# Patient Record
Sex: FEMALE | Race: WHITE | Employment: OTHER | URBAN - METROPOLITAN AREA
[De-identification: names, ages, dates, MRNs, and addresses within clinical notes are randomized per-mention and may not be internally consistent; named-entity substitution may affect disease eponyms.]

---

## 2020-01-01 ENCOUNTER — HOME CARE VISIT (OUTPATIENT)
Dept: HOSPICE | Facility: HOSPICE | Age: 65
End: 2020-01-01
Payer: COMMERCIAL

## 2020-01-01 ENCOUNTER — APPOINTMENT (OUTPATIENT)
Dept: MRI IMAGING | Age: 65
DRG: 041 | End: 2020-01-01
Attending: HOSPITALIST
Payer: COMMERCIAL

## 2020-01-01 ENCOUNTER — HOSPITAL ENCOUNTER (OUTPATIENT)
Dept: MRI IMAGING | Age: 65
DRG: 041 | End: 2020-01-01
Attending: HOSPITALIST
Payer: COMMERCIAL

## 2020-01-01 ENCOUNTER — APPOINTMENT (OUTPATIENT)
Dept: CT IMAGING | Age: 65
DRG: 041 | End: 2020-01-01
Attending: HOSPITALIST
Payer: COMMERCIAL

## 2020-01-01 ENCOUNTER — HOSPITAL ENCOUNTER (INPATIENT)
Age: 65
LOS: 7 days | Discharge: HOSPICE | DRG: 041 | End: 2020-09-07
Attending: EMERGENCY MEDICINE | Admitting: HOSPITALIST
Payer: COMMERCIAL

## 2020-01-01 ENCOUNTER — HOME CARE VISIT (OUTPATIENT)
Dept: SCHEDULING | Facility: HOME HEALTH | Age: 65
End: 2020-01-01
Payer: COMMERCIAL

## 2020-01-01 ENCOUNTER — ANESTHESIA (OUTPATIENT)
Dept: SURGERY | Age: 65
DRG: 041 | End: 2020-01-01
Payer: COMMERCIAL

## 2020-01-01 ENCOUNTER — APPOINTMENT (OUTPATIENT)
Dept: MRI IMAGING | Age: 65
DRG: 041 | End: 2020-01-01
Attending: PSYCHIATRY & NEUROLOGY
Payer: COMMERCIAL

## 2020-01-01 ENCOUNTER — PATIENT OUTREACH (OUTPATIENT)
Dept: CASE MANAGEMENT | Age: 65
End: 2020-01-01

## 2020-01-01 ENCOUNTER — APPOINTMENT (OUTPATIENT)
Dept: GENERAL RADIOLOGY | Age: 65
DRG: 041 | End: 2020-01-01
Attending: SURGERY
Payer: COMMERCIAL

## 2020-01-01 ENCOUNTER — HOSPITAL ENCOUNTER (OUTPATIENT)
Dept: CT IMAGING | Age: 65
Discharge: HOME | End: 2020-08-11
Attending: NURSE PRACTITIONER
Payer: COMMERCIAL

## 2020-01-01 ENCOUNTER — APPOINTMENT (OUTPATIENT)
Dept: GENERAL RADIOLOGY | Age: 65
DRG: 041 | End: 2020-01-01
Attending: HOSPITALIST
Payer: COMMERCIAL

## 2020-01-01 ENCOUNTER — ANESTHESIA EVENT (OUTPATIENT)
Dept: SURGERY | Age: 65
DRG: 041 | End: 2020-01-01
Payer: COMMERCIAL

## 2020-01-01 ENCOUNTER — HOSPICE ADMISSION (OUTPATIENT)
Dept: HOSPICE | Facility: HOSPICE | Age: 65
End: 2020-01-01
Payer: COMMERCIAL

## 2020-01-01 ENCOUNTER — APPOINTMENT (OUTPATIENT)
Dept: VASCULAR SURGERY | Age: 65
DRG: 041 | End: 2020-01-01
Attending: PSYCHIATRY & NEUROLOGY
Payer: COMMERCIAL

## 2020-01-01 VITALS
HEART RATE: 84 BPM | HEIGHT: 69 IN | SYSTOLIC BLOOD PRESSURE: 149 MMHG | DIASTOLIC BLOOD PRESSURE: 66 MMHG | OXYGEN SATURATION: 96 % | TEMPERATURE: 98.1 F | RESPIRATION RATE: 18 BRPM | BODY MASS INDEX: 34.07 KG/M2 | WEIGHT: 230 LBS

## 2020-01-01 VITALS
WEIGHT: 230 LBS | OXYGEN SATURATION: 98 % | HEART RATE: 100 BPM | SYSTOLIC BLOOD PRESSURE: 130 MMHG | HEIGHT: 69 IN | DIASTOLIC BLOOD PRESSURE: 84 MMHG | RESPIRATION RATE: 16 BRPM | BODY MASS INDEX: 34.07 KG/M2

## 2020-01-01 VITALS
SYSTOLIC BLOOD PRESSURE: 140 MMHG | HEART RATE: 120 BPM | DIASTOLIC BLOOD PRESSURE: 80 MMHG | RESPIRATION RATE: 24 BRPM | TEMPERATURE: 98.1 F

## 2020-01-01 VITALS
OXYGEN SATURATION: 97 % | SYSTOLIC BLOOD PRESSURE: 120 MMHG | RESPIRATION RATE: 22 BRPM | HEART RATE: 122 BPM | DIASTOLIC BLOOD PRESSURE: 58 MMHG | TEMPERATURE: 97.8 F

## 2020-01-01 DIAGNOSIS — R42 DIZZINESS: ICD-10-CM

## 2020-01-01 DIAGNOSIS — R29.6 REPEATED FALLS: ICD-10-CM

## 2020-01-01 DIAGNOSIS — R26.1 PARAPLEGIC GAIT: ICD-10-CM

## 2020-01-01 DIAGNOSIS — I67.89 CEREBRAL MICROVASCULAR DISEASE: ICD-10-CM

## 2020-01-01 DIAGNOSIS — R55 CONVULSIVE SYNCOPE: ICD-10-CM

## 2020-01-01 DIAGNOSIS — C34.91 PRIMARY MALIGNANT NEOPLASM OF RIGHT LUNG METASTATIC TO OTHER SITE (HCC): ICD-10-CM
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
## 2020-01-01 DIAGNOSIS — R59.1 LYMPHADENOPATHY: ICD-10-CM

## 2020-01-01 DIAGNOSIS — C70.0: ICD-10-CM

## 2020-01-01 DIAGNOSIS — R63.4 WEIGHT LOSS: ICD-10-CM

## 2020-01-01 DIAGNOSIS — R41.0 CONFUSION: ICD-10-CM

## 2020-01-01 DIAGNOSIS — I65.23 BILATERAL CAROTID ARTERY STENOSIS: ICD-10-CM

## 2020-01-01 DIAGNOSIS — C34.90 LUNG CANCER METASTATIC TO BRAIN (HCC): ICD-10-CM

## 2020-01-01 DIAGNOSIS — R41.82 ACUTE ALTERATION IN MENTAL STATUS: ICD-10-CM

## 2020-01-01 DIAGNOSIS — R29.898 BILATERAL LEG WEAKNESS: Primary | ICD-10-CM

## 2020-01-01 DIAGNOSIS — C79.31 LUNG CANCER METASTATIC TO BRAIN (HCC): ICD-10-CM

## 2020-01-01 DIAGNOSIS — H53.9 VISUAL DISTURBANCES: ICD-10-CM
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
## 2020-01-01 LAB
ACHR AB SER-SCNC: <0.03 NMOL/L (ref 0–0.24)
ACHR BLOCK AB SER-ACNC: 24 % (ref 0–25)
ACHR MOD AB/ACHR TOTAL SFR SER: <12 % (ref 0–20)
ALBUMIN SERPL-MCNC: 2.8 G/DL (ref 3.5–5)
ALBUMIN SERPL-MCNC: 2.9 G/DL (ref 3.5–5)
ALBUMIN SERPL-MCNC: 2.9 G/DL (ref 3.5–5)
ALBUMIN SERPL-MCNC: 3 G/DL (ref 3.5–5)
ALBUMIN/GLOB SERPL: 0.8 {RATIO} (ref 1.1–2.2)
ALP SERPL-CCNC: 100 U/L (ref 45–117)
ALP SERPL-CCNC: 106 U/L (ref 45–117)
ALP SERPL-CCNC: 106 U/L (ref 45–117)
ALP SERPL-CCNC: 110 U/L (ref 45–117)
ALP SERPL-CCNC: 97 U/L (ref 45–117)
ALP SERPL-CCNC: 99 U/L (ref 45–117)
ALT SERPL-CCNC: 103 U/L (ref 12–78)
ALT SERPL-CCNC: 44 U/L (ref 12–78)
ALT SERPL-CCNC: 54 U/L (ref 12–78)
ALT SERPL-CCNC: 58 U/L (ref 12–78)
ALT SERPL-CCNC: 60 U/L (ref 12–78)
ALT SERPL-CCNC: 65 U/L (ref 12–78)
AMPHET UR QL SCN: NEGATIVE
ANA SER QL: POSITIVE
ANION GAP SERPL CALC-SCNC: 0 MMOL/L (ref 5–15)
ANION GAP SERPL CALC-SCNC: 1 MMOL/L (ref 5–15)
ANION GAP SERPL CALC-SCNC: 2 MMOL/L (ref 5–15)
ANION GAP SERPL CALC-SCNC: 3 MMOL/L (ref 5–15)
ANION GAP SERPL CALC-SCNC: 5 MMOL/L (ref 5–15)
ANION GAP SERPL CALC-SCNC: 5 MMOL/L (ref 5–15)
APPEARANCE UR: ABNORMAL
AST SERPL-CCNC: 26 U/L (ref 15–37)
AST SERPL-CCNC: 30 U/L (ref 15–37)
AST SERPL-CCNC: 38 U/L (ref 15–37)
AST SERPL-CCNC: 44 U/L (ref 15–37)
AST SERPL-CCNC: 46 U/L (ref 15–37)
AST SERPL-CCNC: 52 U/L (ref 15–37)
ATRIAL RATE: 96 BPM
BACTERIA SPEC CULT: ABNORMAL
BACTERIA URNS QL MICRO: ABNORMAL /HPF
BARBITURATES UR QL SCN: NEGATIVE
BASOPHILS # BLD: 0 K/UL (ref 0–0.1)
BASOPHILS NFR BLD: 0 % (ref 0–1)
BENZODIAZ UR QL: NEGATIVE
BILIRUB SERPL-MCNC: 0.5 MG/DL (ref 0.2–1)
BILIRUB SERPL-MCNC: 0.9 MG/DL (ref 0.2–1)
BILIRUB SERPL-MCNC: 1 MG/DL (ref 0.2–1)
BILIRUB SERPL-MCNC: 1 MG/DL (ref 0.2–1)
BILIRUB UR QL CFM: NEGATIVE
BUN SERPL-MCNC: 19 MG/DL (ref 6–20)
BUN SERPL-MCNC: 19 MG/DL (ref 6–20)
BUN SERPL-MCNC: 20 MG/DL (ref 6–20)
BUN SERPL-MCNC: 21 MG/DL (ref 6–20)
BUN SERPL-MCNC: 21 MG/DL (ref 6–20)
BUN SERPL-MCNC: 26 MG/DL (ref 6–20)
BUN/CREAT SERPL: 36 (ref 12–20)
BUN/CREAT SERPL: 37 (ref 12–20)
BUN/CREAT SERPL: 42 (ref 12–20)
BUN/CREAT SERPL: 46 (ref 12–20)
CALCIUM SERPL-MCNC: 8.3 MG/DL (ref 8.5–10.1)
CALCIUM SERPL-MCNC: 8.9 MG/DL (ref 8.5–10.1)
CALCIUM SERPL-MCNC: 8.9 MG/DL (ref 8.5–10.1)
CALCIUM SERPL-MCNC: 9.1 MG/DL (ref 8.5–10.1)
CALCULATED P AXIS, ECG09: 25 DEGREES
CALCULATED R AXIS, ECG10: 49 DEGREES
CALCULATED T AXIS, ECG11: 47 DEGREES
CANCER AG125 SERPL-ACNC: 353 U/ML (ref 1.5–35)
CANNABINOIDS UR QL SCN: NEGATIVE
CC UR VC: ABNORMAL
CHLORIDE SERPL-SCNC: 100 MMOL/L (ref 97–108)
CHLORIDE SERPL-SCNC: 98 MMOL/L (ref 97–108)
CHLORIDE SERPL-SCNC: 98 MMOL/L (ref 97–108)
CHLORIDE SERPL-SCNC: 99 MMOL/L (ref 97–108)
CHOLEST SERPL-MCNC: 130 MG/DL
CHOLEST SERPL-MCNC: 134 MG/DL
CK SERPL-CCNC: 105 U/L (ref 26–192)
CK SERPL-CCNC: 85 U/L (ref 26–192)
CO2 SERPL-SCNC: 29 MMOL/L (ref 21–32)
CO2 SERPL-SCNC: 30 MMOL/L (ref 21–32)
CO2 SERPL-SCNC: 31 MMOL/L (ref 21–32)
CO2 SERPL-SCNC: 32 MMOL/L (ref 21–32)
CO2 SERPL-SCNC: 33 MMOL/L (ref 21–32)
CO2 SERPL-SCNC: 33 MMOL/L (ref 21–32)
COCAINE UR QL SCN: NEGATIVE
COLOR UR: ABNORMAL
COMMENT, HOLDF: NORMAL
CREAT SERPL-MCNC: 0.5 MG/DL (ref 0.55–1.02)
CREAT SERPL-MCNC: 0.51 MG/DL (ref 0.55–1.02)
CREAT SERPL-MCNC: 0.53 MG/DL (ref 0.55–1.02)
CREAT SERPL-MCNC: 0.55 MG/DL (ref 0.55–1.02)
CREAT SERPL-MCNC: 0.56 MG/DL (ref 0.55–1.02)
CREAT SERPL-MCNC: 0.59 MG/DL (ref 0.55–1.02)
DIAGNOSIS, 93000: NORMAL
DIFFERENTIAL METHOD BLD: ABNORMAL
DRUG SCRN COMMENT,DRGCM: NORMAL
EOSINOPHIL # BLD: 0 K/UL (ref 0–0.4)
EOSINOPHIL # BLD: 0.1 K/UL (ref 0–0.4)
EOSINOPHIL # BLD: 0.1 K/UL (ref 0–0.4)
EOSINOPHIL NFR BLD: 0 % (ref 0–7)
EOSINOPHIL NFR BLD: 1 % (ref 0–7)
EOSINOPHIL NFR BLD: 1 % (ref 0–7)
EPITH CASTS URNS QL MICRO: ABNORMAL /LPF
ERYTHROCYTE [DISTWIDTH] IN BLOOD BY AUTOMATED COUNT: 15.4 % (ref 11.5–14.5)
ERYTHROCYTE [DISTWIDTH] IN BLOOD BY AUTOMATED COUNT: 15.4 % (ref 11.5–14.5)
ERYTHROCYTE [DISTWIDTH] IN BLOOD BY AUTOMATED COUNT: 15.5 % (ref 11.5–14.5)
EST. AVERAGE GLUCOSE BLD GHB EST-MCNC: 105 MG/DL
FOLATE SERPL-MCNC: 9.2 NG/ML (ref 5–21)
GLOBULIN SER CALC-MCNC: 3.4 G/DL (ref 2–4)
GLOBULIN SER CALC-MCNC: 3.5 G/DL (ref 2–4)
GLOBULIN SER CALC-MCNC: 3.6 G/DL (ref 2–4)
GLOBULIN SER CALC-MCNC: 3.6 G/DL (ref 2–4)
GLOBULIN SER CALC-MCNC: 3.7 G/DL (ref 2–4)
GLOBULIN SER CALC-MCNC: 3.9 G/DL (ref 2–4)
GLUCOSE SERPL-MCNC: 105 MG/DL (ref 65–100)
GLUCOSE SERPL-MCNC: 126 MG/DL (ref 65–100)
GLUCOSE SERPL-MCNC: 128 MG/DL (ref 65–100)
GLUCOSE SERPL-MCNC: 132 MG/DL (ref 65–100)
GLUCOSE SERPL-MCNC: 144 MG/DL (ref 65–100)
GLUCOSE SERPL-MCNC: 152 MG/DL (ref 65–100)
GLUCOSE UR STRIP.AUTO-MCNC: NEGATIVE MG/DL
HBA1C MFR BLD: 5.3 % (ref 4–5.6)
HCT VFR BLD AUTO: 39.9 % (ref 35–47)
HCT VFR BLD AUTO: 42 % (ref 35–47)
HCT VFR BLD AUTO: 42.8 % (ref 35–47)
HDLC SERPL-MCNC: 38 MG/DL
HDLC SERPL-MCNC: 41 MG/DL
HDLC SERPL: 3.3 {RATIO} (ref 0–5)
HDLC SERPL: 3.4 {RATIO} (ref 0–5)
HGB BLD-MCNC: 12.8 G/DL (ref 11.5–16)
HGB BLD-MCNC: 13.5 G/DL (ref 11.5–16)
HGB BLD-MCNC: 13.7 G/DL (ref 11.5–16)
HGB UR QL STRIP: NEGATIVE
HYALINE CASTS URNS QL MICRO: ABNORMAL /LPF (ref 0–5)
IMM GRANULOCYTES # BLD AUTO: 0 K/UL (ref 0–0.04)
IMM GRANULOCYTES # BLD AUTO: 0.1 K/UL (ref 0–0.04)
IMM GRANULOCYTES # BLD AUTO: 0.1 K/UL (ref 0–0.04)
IMM GRANULOCYTES NFR BLD AUTO: 0 % (ref 0–0.5)
IMM GRANULOCYTES NFR BLD AUTO: 1 % (ref 0–0.5)
IMM GRANULOCYTES NFR BLD AUTO: 1 % (ref 0–0.5)
INR PPP: 1.2 (ref 0.9–1.1)
INTERPRETATION, NEU2LT: NORMAL
KETONES UR QL STRIP.AUTO: 15 MG/DL
LDLC SERPL CALC-MCNC: 78 MG/DL (ref 0–100)
LDLC SERPL CALC-MCNC: 79.2 MG/DL (ref 0–100)
LEFT CCA DIST DIAS: 12.8 CM/S
LEFT CCA DIST SYS: 82.2 CM/S
LEFT CCA PROX DIAS: 0 CM/S
LEFT CCA PROX SYS: 75 CM/S
LEFT ECA DIAS: 7.99 CM/S
LEFT ECA SYS: 54.5 CM/S
LEFT ICA DIST DIAS: 5.8 CM/S
LEFT ICA DIST SYS: 41.1 CM/S
LEFT ICA MID DIAS: 9.5 CM/S
LEFT ICA MID SYS: 41.6 CM/S
LEFT ICA PROX DIAS: 14.4 CM/S
LEFT ICA PROX SYS: 49.9 CM/S
LEFT ICA/CCA SYS: 0.61
LEFT SUBCLAVIAN DIAS: 0 CM/S
LEFT SUBCLAVIAN SYS: 98.4 CM/S
LEFT VERTEBRAL DIAS: 12.79 CM/S
LEFT VERTEBRAL SYS: 59 CM/S
LEUKOCYTE ESTERASE UR QL STRIP.AUTO: ABNORMAL
LIPID PROFILE,FLP: NORMAL
LIPID PROFILE,FLP: NORMAL
LYMPHOCYTES # BLD: 0.4 K/UL (ref 0.8–3.5)
LYMPHOCYTES # BLD: 1 K/UL (ref 0.8–3.5)
LYMPHOCYTES # BLD: 1.1 K/UL (ref 0.8–3.5)
LYMPHOCYTES NFR BLD: 11 % (ref 12–49)
LYMPHOCYTES NFR BLD: 11 % (ref 12–49)
LYMPHOCYTES NFR BLD: 4 % (ref 12–49)
MAGNESIUM SERPL-MCNC: 2.1 MG/DL (ref 1.6–2.4)
MCH RBC QN AUTO: 26.1 PG (ref 26–34)
MCH RBC QN AUTO: 26.2 PG (ref 26–34)
MCH RBC QN AUTO: 26.2 PG (ref 26–34)
MCHC RBC AUTO-ENTMCNC: 32 G/DL (ref 30–36.5)
MCHC RBC AUTO-ENTMCNC: 32.1 G/DL (ref 30–36.5)
MCHC RBC AUTO-ENTMCNC: 32.1 G/DL (ref 30–36.5)
MCV RBC AUTO: 81.6 FL (ref 80–99)
MCV RBC AUTO: 81.7 FL (ref 80–99)
MCV RBC AUTO: 81.8 FL (ref 80–99)
METHADONE UR QL: NEGATIVE
METHOD, NEU3LT: NORMAL
MONOCYTES # BLD: 0.5 K/UL (ref 0–1)
MONOCYTES # BLD: 1.1 K/UL (ref 0–1)
MONOCYTES # BLD: 1.2 K/UL (ref 0–1)
MONOCYTES NFR BLD: 12 % (ref 5–13)
MONOCYTES NFR BLD: 12 % (ref 5–13)
MONOCYTES NFR BLD: 5 % (ref 5–13)
NEURONAL CELL AB, NEU1LT: 16 UNITS (ref 0–54)
NEUTS SEG # BLD: 7 K/UL (ref 1.8–8)
NEUTS SEG # BLD: 7.9 K/UL (ref 1.8–8)
NEUTS SEG # BLD: 9.6 K/UL (ref 1.8–8)
NEUTS SEG NFR BLD: 75 % (ref 32–75)
NEUTS SEG NFR BLD: 76 % (ref 32–75)
NEUTS SEG NFR BLD: 90 % (ref 32–75)
NITRITE UR QL STRIP.AUTO: POSITIVE
NRBC # BLD: 0 K/UL (ref 0–0.01)
NRBC BLD-RTO: 0 PER 100 WBC
OPIATES UR QL: NEGATIVE
P-R INTERVAL, ECG05: 124 MS
PCP UR QL: NEGATIVE
PH UR STRIP: 5.5 [PH] (ref 5–8)
PLATELET # BLD AUTO: 235 K/UL (ref 150–400)
PLATELET # BLD AUTO: 292 K/UL (ref 150–400)
PLATELET # BLD AUTO: 311 K/UL (ref 150–400)
PMV BLD AUTO: 10.1 FL (ref 8.9–12.9)
PMV BLD AUTO: 10.5 FL (ref 8.9–12.9)
PMV BLD AUTO: 9.7 FL (ref 8.9–12.9)
POTASSIUM SERPL-SCNC: 3.2 MMOL/L (ref 3.5–5.1)
POTASSIUM SERPL-SCNC: 3.3 MMOL/L (ref 3.5–5.1)
POTASSIUM SERPL-SCNC: 3.3 MMOL/L (ref 3.5–5.1)
POTASSIUM SERPL-SCNC: 3.9 MMOL/L (ref 3.5–5.1)
POTASSIUM SERPL-SCNC: 4 MMOL/L (ref 3.5–5.1)
POTASSIUM SERPL-SCNC: 4.5 MMOL/L (ref 3.5–5.1)
PROT SERPL-MCNC: 6.2 G/DL (ref 6.4–8.2)
PROT SERPL-MCNC: 6.4 G/DL (ref 6.4–8.2)
PROT SERPL-MCNC: 6.6 G/DL (ref 6.4–8.2)
PROT SERPL-MCNC: 6.9 G/DL (ref 6.4–8.2)
PROT UR STRIP-MCNC: 30 MG/DL
PROTHROMBIN TIME: 12 SEC (ref 9–11.1)
Q-T INTERVAL, ECG07: 364 MS
QRS DURATION, ECG06: 82 MS
QTC CALCULATION (BEZET), ECG08: 459 MS
RBC # BLD AUTO: 4.88 M/UL (ref 3.8–5.2)
RBC # BLD AUTO: 5.15 M/UL (ref 3.8–5.2)
RBC # BLD AUTO: 5.24 M/UL (ref 3.8–5.2)
RBC #/AREA URNS HPF: ABNORMAL /HPF (ref 0–5)
RBC MORPH BLD: ABNORMAL
RIGHT CCA DIST DIAS: 12.8 CM/S
RIGHT CCA DIST SYS: 64.6 CM/S
RIGHT CCA PROX DIAS: 0 CM/S
RIGHT CCA PROX SYS: 59.1 CM/S
RIGHT ECA DIAS: 15.5 CM/S
RIGHT ECA SYS: 114.2 CM/S
RIGHT ICA DIST DIAS: 13.4 CM/S
RIGHT ICA DIST SYS: 45.7 CM/S
RIGHT ICA MID DIAS: 11.4 CM/S
RIGHT ICA MID SYS: 36.1 CM/S
RIGHT ICA PROX DIAS: 5.4 CM/S
RIGHT ICA PROX SYS: 27.4 CM/S
RIGHT ICA/CCA SYS: 0.7
RIGHT SUBCLAVIAN DIAS: 0 CM/S
RIGHT SUBCLAVIAN SYS: 94 CM/S
RIGHT VERTEBRAL DIAS: 8.93 CM/S
RIGHT VERTEBRAL SYS: 49.1 CM/S
RPR SER QL: NONREACTIVE
SAMPLES BEING HELD,HOLD: NORMAL
SELENIUM SERPL-MCNC: 128 UG/L (ref 91–198)
SERVICE CMNT-IMP: ABNORMAL
SODIUM SERPL-SCNC: 132 MMOL/L (ref 136–145)
SODIUM SERPL-SCNC: 134 MMOL/L (ref 136–145)
SODIUM SERPL-SCNC: 135 MMOL/L (ref 136–145)
SP GR UR REFRACTOMETRY: 1.02 (ref 1–1.03)
STRIA MUS AB TITR SER IF: NEGATIVE {TITER}
TRIGL SERPL-MCNC: 69 MG/DL (ref ?–150)
TRIGL SERPL-MCNC: 70 MG/DL (ref ?–150)
TSH SERPL DL<=0.05 MIU/L-ACNC: 0.86 UIU/ML (ref 0.36–3.74)
UA: UC IF INDICATED,UAUC: ABNORMAL
UROBILINOGEN UR QL STRIP.AUTO: 1 EU/DL (ref 0.2–1)
VENTRICULAR RATE, ECG03: 96 BPM
VGCC AB SER QL: NEGATIVE
VIT B12 SERPL-MCNC: 902 PG/ML (ref 193–986)
VLDLC SERPL CALC-MCNC: 13.8 MG/DL
VLDLC SERPL CALC-MCNC: 14 MG/DL
WBC # BLD AUTO: 10.4 K/UL (ref 3.6–11)
WBC # BLD AUTO: 10.6 K/UL (ref 3.6–11)
WBC # BLD AUTO: 9.3 K/UL (ref 3.6–11)
WBC URNS QL MICRO: ABNORMAL /HPF (ref 0–4)

## 2020-01-01 PROCEDURE — 74011250637 HC RX REV CODE- 250/637: Performed by: INTERNAL MEDICINE

## 2020-01-01 PROCEDURE — 74011000250 HC RX REV CODE- 250: Performed by: SURGERY

## 2020-01-01 PROCEDURE — 38510 BIOPSY/REMOVAL LYMPH NODES: CPT | Performed by: SURGERY

## 2020-01-01 PROCEDURE — 65660000000 HC RM CCU STEPDOWN

## 2020-01-01 PROCEDURE — 74011250636 HC RX REV CODE- 250/636: Performed by: PSYCHIATRY & NEUROLOGY

## 2020-01-01 PROCEDURE — 74011250637 HC RX REV CODE- 250/637: Performed by: HOSPITALIST

## 2020-01-01 PROCEDURE — 83036 HEMOGLOBIN GLYCOSYLATED A1C: CPT

## 2020-01-01 PROCEDURE — 74011000636 HC RX REV CODE- 636: Performed by: EMERGENCY MEDICINE

## 2020-01-01 PROCEDURE — 77030010516 HC APPL HEMA CLP TELE -B: Performed by: SURGERY

## 2020-01-01 PROCEDURE — 36415 COLL VENOUS BLD VENIPUNCTURE: CPT

## 2020-01-01 PROCEDURE — T4524 ADULT SIZE BRIEF/DIAPER XL: HCPCS

## 2020-01-01 PROCEDURE — 85025 COMPLETE CBC W/AUTO DIFF WBC: CPT

## 2020-01-01 PROCEDURE — A9270 NON-COVERED ITEM OR SERVICE: HCPCS

## 2020-01-01 PROCEDURE — 83519 RIA NONANTIBODY: CPT

## 2020-01-01 PROCEDURE — 72157 MRI CHEST SPINE W/O & W/DYE: CPT

## 2020-01-01 PROCEDURE — 85610 PROTHROMBIN TIME: CPT

## 2020-01-01 PROCEDURE — 70553 MRI BRAIN STEM W/O & W/DYE: CPT

## 2020-01-01 PROCEDURE — 77030038269 HC DRN EXT URIN PURWCK BARD -A

## 2020-01-01 PROCEDURE — 71045 X-RAY EXAM CHEST 1 VIEW: CPT

## 2020-01-01 PROCEDURE — 74011000258 HC RX REV CODE- 258: Performed by: INTERNAL MEDICINE

## 2020-01-01 PROCEDURE — 82607 VITAMIN B-12: CPT

## 2020-01-01 PROCEDURE — 74011250636 HC RX REV CODE- 250/636: Performed by: INTERNAL MEDICINE

## 2020-01-01 PROCEDURE — 97530 THERAPEUTIC ACTIVITIES: CPT

## 2020-01-01 PROCEDURE — 74011250636 HC RX REV CODE- 250/636: Performed by: HOSPITALIST

## 2020-01-01 PROCEDURE — A9575 INJ GADOTERATE MEGLUMI 0.1ML: HCPCS | Performed by: INTERNAL MEDICINE

## 2020-01-01 PROCEDURE — 0651 HSPC ROUTINE HOME CARE

## 2020-01-01 PROCEDURE — 93005 ELECTROCARDIOGRAM TRACING: CPT

## 2020-01-01 PROCEDURE — 70450 CT HEAD/BRAIN W/O DYE: CPT

## 2020-01-01 PROCEDURE — 83520 IMMUNOASSAY QUANT NOS NONAB: CPT

## 2020-01-01 PROCEDURE — G0299 HHS/HOSPICE OF RN EA 15 MIN: HCPCS

## 2020-01-01 PROCEDURE — 99233 SBSQ HOSP IP/OBS HIGH 50: CPT | Performed by: PSYCHIATRY & NEUROLOGY

## 2020-01-01 PROCEDURE — 77030010507 HC ADH SKN DERMBND J&J -B: Performed by: SURGERY

## 2020-01-01 PROCEDURE — 76010000138 HC OR TIME 0.5 TO 1 HR: Performed by: SURGERY

## 2020-01-01 PROCEDURE — 77030003029 HC SUT VCRL J&J -B: Performed by: SURGERY

## 2020-01-01 PROCEDURE — 74011250637 HC RX REV CODE- 250/637: Performed by: SURGERY

## 2020-01-01 PROCEDURE — 97535 SELF CARE MNGMENT TRAINING: CPT

## 2020-01-01 PROCEDURE — 93880 EXTRACRANIAL BILAT STUDY: CPT | Performed by: PSYCHIATRY & NEUROLOGY

## 2020-01-01 PROCEDURE — A6250 SKIN SEAL PROTECT MOISTURIZR: HCPCS

## 2020-01-01 PROCEDURE — 77030011640 HC PAD GRND REM COVD -A: Performed by: SURGERY

## 2020-01-01 PROCEDURE — 88305 TISSUE EXAM BY PATHOLOGIST: CPT

## 2020-01-01 PROCEDURE — 82550 ASSAY OF CK (CPK): CPT

## 2020-01-01 PROCEDURE — 84630 ASSAY OF ZINC: CPT

## 2020-01-01 PROCEDURE — 99223 1ST HOSP IP/OBS HIGH 75: CPT | Performed by: PSYCHIATRY & NEUROLOGY

## 2020-01-01 PROCEDURE — 07B20ZX EXCISION OF LEFT NECK LYMPHATIC, OPEN APPROACH, DIAGNOSTIC: ICD-10-PCS | Performed by: SURGERY

## 2020-01-01 PROCEDURE — 80053 COMPREHEN METABOLIC PANEL: CPT

## 2020-01-01 PROCEDURE — 99232 SBSQ HOSP IP/OBS MODERATE 35: CPT | Performed by: PSYCHIATRY & NEUROLOGY

## 2020-01-01 PROCEDURE — 97116 GAIT TRAINING THERAPY: CPT

## 2020-01-01 PROCEDURE — 87077 CULTURE AEROBIC IDENTIFY: CPT

## 2020-01-01 PROCEDURE — 88341 IMHCHEM/IMCYTCHM EA ADD ANTB: CPT

## 2020-01-01 PROCEDURE — 99232 SBSQ HOSP IP/OBS MODERATE 35: CPT | Performed by: INTERNAL MEDICINE

## 2020-01-01 PROCEDURE — 77030002996 HC SUT SLK J&J -A: Performed by: SURGERY

## 2020-01-01 PROCEDURE — 74011250636 HC RX REV CODE- 250/636: Performed by: ANESTHESIOLOGY

## 2020-01-01 PROCEDURE — 76060000032 HC ANESTHESIA 0.5 TO 1 HR: Performed by: SURGERY

## 2020-01-01 PROCEDURE — A4314 CATH W/DRAINAGE 2-WAY LATEX: HCPCS

## 2020-01-01 PROCEDURE — 70486 CT MAXILLOFACIAL W/O DYE: CPT

## 2020-01-01 PROCEDURE — 84255 ASSAY OF SELENIUM: CPT

## 2020-01-01 PROCEDURE — 74011250636 HC RX REV CODE- 250/636: Performed by: NURSE ANESTHETIST, CERTIFIED REGISTERED

## 2020-01-01 PROCEDURE — 77030018390 HC SPNG HEMSTAT2 J&J -B: Performed by: SURGERY

## 2020-01-01 PROCEDURE — 82746 ASSAY OF FOLIC ACID SERUM: CPT

## 2020-01-01 PROCEDURE — 77030031139 HC SUT VCRL2 J&J -A: Performed by: SURGERY

## 2020-01-01 PROCEDURE — 77030010938 HC CLP LIG TELE -A: Performed by: SURGERY

## 2020-01-01 PROCEDURE — 97165 OT EVAL LOW COMPLEX 30 MIN: CPT

## 2020-01-01 PROCEDURE — 92610 EVALUATE SWALLOWING FUNCTION: CPT | Performed by: SPEECH-LANGUAGE PATHOLOGIST

## 2020-01-01 PROCEDURE — 83735 ASSAY OF MAGNESIUM: CPT

## 2020-01-01 PROCEDURE — 87186 SC STD MICRODIL/AGAR DIL: CPT

## 2020-01-01 PROCEDURE — 77030021678 HC GLIDESCP STAT DISP VERT -B: Performed by: ANESTHESIOLOGY

## 2020-01-01 PROCEDURE — 77030026438 HC STYL ET INTUB CARD -A: Performed by: NURSE ANESTHETIST, CERTIFIED REGISTERED

## 2020-01-01 PROCEDURE — 99284 EMERGENCY DEPT VISIT MOD MDM: CPT

## 2020-01-01 PROCEDURE — 86038 ANTINUCLEAR ANTIBODIES: CPT

## 2020-01-01 PROCEDURE — HOSPICE MEDICATION HC HH HOSPICE MEDICATION

## 2020-01-01 PROCEDURE — 71260 CT THORAX DX C+: CPT

## 2020-01-01 PROCEDURE — 99222 1ST HOSP IP/OBS MODERATE 55: CPT | Performed by: INTERNAL MEDICINE

## 2020-01-01 PROCEDURE — 87086 URINE CULTURE/COLONY COUNT: CPT

## 2020-01-01 PROCEDURE — 77030008684 HC TU ET CUF COVD -B: Performed by: NURSE ANESTHETIST, CERTIFIED REGISTERED

## 2020-01-01 PROCEDURE — 84443 ASSAY THYROID STIM HORMONE: CPT

## 2020-01-01 PROCEDURE — 72158 MRI LUMBAR SPINE W/O & W/DYE: CPT

## 2020-01-01 PROCEDURE — 74011000250 HC RX REV CODE- 250: Performed by: NURSE ANESTHETIST, CERTIFIED REGISTERED

## 2020-01-01 PROCEDURE — 74011250636 HC RX REV CODE- 250/636: Performed by: EMERGENCY MEDICINE

## 2020-01-01 PROCEDURE — 80061 LIPID PANEL: CPT

## 2020-01-01 PROCEDURE — G0155 HHCP-SVS OF CSW,EA 15 MIN: HCPCS

## 2020-01-01 PROCEDURE — 95819 EEG AWAKE AND ASLEEP: CPT | Performed by: PSYCHIATRY & NEUROLOGY

## 2020-01-01 PROCEDURE — 76210000016 HC OR PH I REC 1 TO 1.5 HR: Performed by: SURGERY

## 2020-01-01 PROCEDURE — 86592 SYPHILIS TEST NON-TREP QUAL: CPT

## 2020-01-01 PROCEDURE — 97112 NEUROMUSCULAR REEDUCATION: CPT

## 2020-01-01 PROCEDURE — 88342 IMHCHEM/IMCYTCHM 1ST ANTB: CPT

## 2020-01-01 PROCEDURE — HHS10554 SHAMPOO/BODY WASH 8 OZ ALOE VESTA

## 2020-01-01 PROCEDURE — 72156 MRI NECK SPINE W/O & W/DYE: CPT

## 2020-01-01 PROCEDURE — 3336500001 HSPC ELECTION

## 2020-01-01 PROCEDURE — 95816 EEG AWAKE AND DROWSY: CPT | Performed by: PSYCHIATRY & NEUROLOGY

## 2020-01-01 PROCEDURE — 81001 URINALYSIS AUTO W/SCOPE: CPT

## 2020-01-01 PROCEDURE — 77030018836 HC SOL IRR NACL ICUM -A: Performed by: SURGERY

## 2020-01-01 PROCEDURE — 97162 PT EVAL MOD COMPLEX 30 MIN: CPT

## 2020-01-01 PROCEDURE — 86304 IMMUNOASSAY TUMOR CA 125: CPT

## 2020-01-01 PROCEDURE — 80307 DRUG TEST PRSMV CHEM ANLYZR: CPT

## 2020-01-01 PROCEDURE — 93880 EXTRACRANIAL BILAT STUDY: CPT

## 2020-01-01 PROCEDURE — A4927 NON-STERILE GLOVES: HCPCS

## 2020-01-01 RX ORDER — DEXAMETHASONE SODIUM PHOSPHATE 4 MG/ML
INJECTION, SOLUTION INTRA-ARTICULAR; INTRALESIONAL; INTRAMUSCULAR; INTRAVENOUS; SOFT TISSUE AS NEEDED
Status: DISCONTINUED | OUTPATIENT
Start: 2020-01-01 | End: 2020-01-01 | Stop reason: HOSPADM

## 2020-01-01 RX ORDER — ACETAMINOPHEN 650 MG/1
650 SUPPOSITORY RECTAL
Status: DISCONTINUED | OUTPATIENT
Start: 2020-01-01 | End: 2020-01-01 | Stop reason: HOSPADM

## 2020-01-01 RX ORDER — KETOROLAC TROMETHAMINE 30 MG/ML
30 INJECTION, SOLUTION INTRAMUSCULAR; INTRAVENOUS
Status: DISCONTINUED | OUTPATIENT
Start: 2020-01-01 | End: 2020-01-01 | Stop reason: HOSPADM

## 2020-01-01 RX ORDER — SODIUM CHLORIDE, SODIUM LACTATE, POTASSIUM CHLORIDE, CALCIUM CHLORIDE 600; 310; 30; 20 MG/100ML; MG/100ML; MG/100ML; MG/100ML
25 INJECTION, SOLUTION INTRAVENOUS CONTINUOUS
Status: DISCONTINUED | OUTPATIENT
Start: 2020-01-01 | End: 2020-01-01 | Stop reason: HOSPADM

## 2020-01-01 RX ORDER — ENOXAPARIN SODIUM 100 MG/ML
40 INJECTION SUBCUTANEOUS DAILY
Status: DISCONTINUED | OUTPATIENT
Start: 2020-01-01 | End: 2020-01-01

## 2020-01-01 RX ORDER — SODIUM CHLORIDE 0.9 % (FLUSH) 0.9 %
5-40 SYRINGE (ML) INJECTION AS NEEDED
Status: DISCONTINUED | OUTPATIENT
Start: 2020-01-01 | End: 2020-01-01 | Stop reason: HOSPADM

## 2020-01-01 RX ORDER — LOSARTAN POTASSIUM 100 MG/1
100 TABLET ORAL DAILY
Status: DISCONTINUED | OUTPATIENT
Start: 2020-01-01 | End: 2020-01-01 | Stop reason: HOSPADM

## 2020-01-01 RX ORDER — AMLODIPINE BESYLATE 5 MG/1
10 TABLET ORAL DAILY
Status: DISCONTINUED | OUTPATIENT
Start: 2020-01-01 | End: 2020-01-01 | Stop reason: HOSPADM

## 2020-01-01 RX ORDER — PROPOFOL 10 MG/ML
INJECTION, EMULSION INTRAVENOUS AS NEEDED
Status: DISCONTINUED | OUTPATIENT
Start: 2020-01-01 | End: 2020-01-01 | Stop reason: HOSPADM

## 2020-01-01 RX ORDER — SODIUM CHLORIDE 0.9 % (FLUSH) 0.9 %
10 SYRINGE (ML) INJECTION
Status: COMPLETED | OUTPATIENT
Start: 2020-01-01 | End: 2020-01-01

## 2020-01-01 RX ORDER — LIDOCAINE HYDROCHLORIDE 10 MG/ML
0.1 INJECTION, SOLUTION EPIDURAL; INFILTRATION; INTRACAUDAL; PERINEURAL AS NEEDED
Status: DISCONTINUED | OUTPATIENT
Start: 2020-01-01 | End: 2020-01-01 | Stop reason: HOSPADM

## 2020-01-01 RX ORDER — MORPHINE SULFATE 10 MG/ML
2 INJECTION, SOLUTION INTRAMUSCULAR; INTRAVENOUS
Status: DISCONTINUED | OUTPATIENT
Start: 2020-01-01 | End: 2020-01-01 | Stop reason: HOSPADM

## 2020-01-01 RX ORDER — ONDANSETRON 2 MG/ML
4 INJECTION INTRAMUSCULAR; INTRAVENOUS
Status: DISCONTINUED | OUTPATIENT
Start: 2020-01-01 | End: 2020-01-01 | Stop reason: HOSPADM

## 2020-01-01 RX ORDER — SODIUM CHLORIDE 0.9 % (FLUSH) 0.9 %
5-40 SYRINGE (ML) INJECTION EVERY 8 HOURS
Status: DISCONTINUED | OUTPATIENT
Start: 2020-01-01 | End: 2020-01-01 | Stop reason: HOSPADM

## 2020-01-01 RX ORDER — FAMOTIDINE 20 MG/1
20 TABLET, FILM COATED ORAL 2 TIMES DAILY
Status: DISCONTINUED | OUTPATIENT
Start: 2020-01-01 | End: 2020-01-01 | Stop reason: HOSPADM

## 2020-01-01 RX ORDER — LOSARTAN POTASSIUM 50 MG/1
50 TABLET ORAL DAILY
COMMUNITY
End: 2020-01-01

## 2020-01-01 RX ORDER — FENTANYL CITRATE 50 UG/ML
25 INJECTION, SOLUTION INTRAMUSCULAR; INTRAVENOUS
Status: DISCONTINUED | OUTPATIENT
Start: 2020-01-01 | End: 2020-01-01 | Stop reason: HOSPADM

## 2020-01-01 RX ORDER — MORPHINE SULFATE 2 MG/ML
2 INJECTION, SOLUTION INTRAMUSCULAR; INTRAVENOUS
Status: DISCONTINUED | OUTPATIENT
Start: 2020-01-01 | End: 2020-01-01 | Stop reason: HOSPADM

## 2020-01-01 RX ORDER — FENTANYL CITRATE 50 UG/ML
INJECTION, SOLUTION INTRAMUSCULAR; INTRAVENOUS AS NEEDED
Status: DISCONTINUED | OUTPATIENT
Start: 2020-01-01 | End: 2020-01-01 | Stop reason: HOSPADM

## 2020-01-01 RX ORDER — ACETAMINOPHEN 325 MG/1
650 TABLET ORAL
Status: DISCONTINUED | OUTPATIENT
Start: 2020-01-01 | End: 2020-01-01 | Stop reason: HOSPADM

## 2020-01-01 RX ORDER — ONDANSETRON 2 MG/ML
INJECTION INTRAMUSCULAR; INTRAVENOUS AS NEEDED
Status: DISCONTINUED | OUTPATIENT
Start: 2020-01-01 | End: 2020-01-01 | Stop reason: HOSPADM

## 2020-01-01 RX ORDER — SUCCINYLCHOLINE CHLORIDE 20 MG/ML
INJECTION INTRAMUSCULAR; INTRAVENOUS AS NEEDED
Status: DISCONTINUED | OUTPATIENT
Start: 2020-01-01 | End: 2020-01-01 | Stop reason: HOSPADM

## 2020-01-01 RX ORDER — GADOTERATE MEGLUMINE 376.9 MG/ML
20 INJECTION INTRAVENOUS
Status: COMPLETED | OUTPATIENT
Start: 2020-01-01 | End: 2020-01-01

## 2020-01-01 RX ORDER — MIDAZOLAM HYDROCHLORIDE 1 MG/ML
2.5 INJECTION, SOLUTION INTRAMUSCULAR; INTRAVENOUS
Status: DISCONTINUED | OUTPATIENT
Start: 2020-01-01 | End: 2020-01-01 | Stop reason: HOSPADM

## 2020-01-01 RX ORDER — LABETALOL HYDROCHLORIDE 5 MG/ML
20 INJECTION, SOLUTION INTRAVENOUS
Status: DISCONTINUED | OUTPATIENT
Start: 2020-01-01 | End: 2020-01-01 | Stop reason: HOSPADM

## 2020-01-01 RX ORDER — DIPHENHYDRAMINE HYDROCHLORIDE 50 MG/ML
12.5 INJECTION, SOLUTION INTRAMUSCULAR; INTRAVENOUS
Status: DISCONTINUED | OUTPATIENT
Start: 2020-01-01 | End: 2020-01-01 | Stop reason: HOSPADM

## 2020-01-01 RX ORDER — LIDOCAINE HYDROCHLORIDE 20 MG/ML
INJECTION, SOLUTION EPIDURAL; INFILTRATION; INTRACAUDAL; PERINEURAL AS NEEDED
Status: DISCONTINUED | OUTPATIENT
Start: 2020-01-01 | End: 2020-01-01 | Stop reason: HOSPADM

## 2020-01-01 RX ORDER — AMLODIPINE BESYLATE 5 MG/1
5 TABLET ORAL DAILY
Status: DISCONTINUED | OUTPATIENT
Start: 2020-01-01 | End: 2020-01-01

## 2020-01-01 RX ORDER — ROCURONIUM BROMIDE 10 MG/ML
INJECTION, SOLUTION INTRAVENOUS AS NEEDED
Status: DISCONTINUED | OUTPATIENT
Start: 2020-01-01 | End: 2020-01-01 | Stop reason: HOSPADM

## 2020-01-01 RX ORDER — MIDAZOLAM HYDROCHLORIDE 1 MG/ML
INJECTION, SOLUTION INTRAMUSCULAR; INTRAVENOUS AS NEEDED
Status: DISCONTINUED | OUTPATIENT
Start: 2020-01-01 | End: 2020-01-01 | Stop reason: HOSPADM

## 2020-01-01 RX ORDER — POLYETHYLENE GLYCOL 3350 17 G/17G
17 POWDER, FOR SOLUTION ORAL DAILY PRN
Status: DISCONTINUED | OUTPATIENT
Start: 2020-01-01 | End: 2020-01-01 | Stop reason: HOSPADM

## 2020-01-01 RX ORDER — DEXAMETHASONE SODIUM PHOSPHATE 4 MG/ML
4 INJECTION, SOLUTION INTRA-ARTICULAR; INTRALESIONAL; INTRAMUSCULAR; INTRAVENOUS; SOFT TISSUE EVERY 6 HOURS
Status: DISCONTINUED | OUTPATIENT
Start: 2020-01-01 | End: 2020-01-01 | Stop reason: HOSPADM

## 2020-01-01 RX ORDER — ONDANSETRON 2 MG/ML
4 INJECTION INTRAMUSCULAR; INTRAVENOUS AS NEEDED
Status: DISCONTINUED | OUTPATIENT
Start: 2020-01-01 | End: 2020-01-01 | Stop reason: HOSPADM

## 2020-01-01 RX ORDER — LOSARTAN POTASSIUM 25 MG/1
50 TABLET ORAL DAILY
Status: DISCONTINUED | OUTPATIENT
Start: 2020-01-01 | End: 2020-01-01

## 2020-01-01 RX ORDER — BUPIVACAINE HYDROCHLORIDE AND EPINEPHRINE 5; 5 MG/ML; UG/ML
INJECTION, SOLUTION EPIDURAL; INTRACAUDAL; PERINEURAL AS NEEDED
Status: DISCONTINUED | OUTPATIENT
Start: 2020-01-01 | End: 2020-01-01 | Stop reason: HOSPADM

## 2020-01-01 RX ORDER — PROMETHAZINE HYDROCHLORIDE 25 MG/1
12.5 TABLET ORAL
Status: DISCONTINUED | OUTPATIENT
Start: 2020-01-01 | End: 2020-01-01 | Stop reason: HOSPADM

## 2020-01-01 RX ADMIN — SODIUM CHLORIDE 40 MCG/MIN: 900 INJECTION, SOLUTION INTRAVENOUS at 16:15

## 2020-01-01 RX ADMIN — DIAPER RASH SKIN PROTECTENT: at 21:18

## 2020-01-01 RX ADMIN — FENTANYL CITRATE 100 MCG: 50 INJECTION, SOLUTION INTRAMUSCULAR; INTRAVENOUS at 15:56

## 2020-01-01 RX ADMIN — DEXAMETHASONE SODIUM PHOSPHATE 4 MG: 4 INJECTION, SOLUTION INTRAMUSCULAR; INTRAVENOUS at 06:37

## 2020-01-01 RX ADMIN — DEXAMETHASONE SODIUM PHOSPHATE 4 MG: 4 INJECTION, SOLUTION INTRAMUSCULAR; INTRAVENOUS at 06:32

## 2020-01-01 RX ADMIN — AMLODIPINE BESYLATE 10 MG: 5 TABLET ORAL at 09:24

## 2020-01-01 RX ADMIN — DIAPER RASH SKIN PROTECTENT: at 17:27

## 2020-01-01 RX ADMIN — AMLODIPINE BESYLATE 5 MG: 5 TABLET ORAL at 10:18

## 2020-01-01 RX ADMIN — LORAZEPAM 0.5 MG: 2 CONCENTRATE ORAL at 12:38

## 2020-01-01 RX ADMIN — Medication 10 ML: at 18:17

## 2020-01-01 RX ADMIN — Medication 10 ML: at 21:18

## 2020-01-01 RX ADMIN — Medication 10 ML: at 23:44

## 2020-01-01 RX ADMIN — SODIUM CHLORIDE 1000 ML: 900 INJECTION, SOLUTION INTRAVENOUS at 22:29

## 2020-01-01 RX ADMIN — FAMOTIDINE 20 MG: 20 TABLET, FILM COATED ORAL at 10:17

## 2020-01-01 RX ADMIN — DEXAMETHASONE SODIUM PHOSPHATE 4 MG: 4 INJECTION, SOLUTION INTRAMUSCULAR; INTRAVENOUS at 05:32

## 2020-01-01 RX ADMIN — DEXAMETHASONE SODIUM PHOSPHATE 4 MG: 4 INJECTION, SOLUTION INTRAMUSCULAR; INTRAVENOUS at 18:17

## 2020-01-01 RX ADMIN — DEXAMETHASONE SODIUM PHOSPHATE 4 MG: 4 INJECTION, SOLUTION INTRAMUSCULAR; INTRAVENOUS at 06:21

## 2020-01-01 RX ADMIN — Medication 10 ML: at 05:36

## 2020-01-01 RX ADMIN — Medication 1 AMPULE: at 21:49

## 2020-01-01 RX ADMIN — DEXAMETHASONE SODIUM PHOSPHATE 4 MG: 4 INJECTION, SOLUTION INTRAMUSCULAR; INTRAVENOUS at 05:35

## 2020-01-01 RX ADMIN — DIAPER RASH SKIN PROTECTENT: at 18:07

## 2020-01-01 RX ADMIN — Medication 10 ML: at 21:57

## 2020-01-01 RX ADMIN — Medication 3 AMPULE: at 15:20

## 2020-01-01 RX ADMIN — DEXAMETHASONE SODIUM PHOSPHATE 4 MG: 4 INJECTION, SOLUTION INTRAMUSCULAR; INTRAVENOUS at 23:00

## 2020-01-01 RX ADMIN — PROPOFOL 130 MG: 10 INJECTION, EMULSION INTRAVENOUS at 15:56

## 2020-01-01 RX ADMIN — FENTANYL CITRATE 25 MCG: 50 INJECTION, SOLUTION INTRAMUSCULAR; INTRAVENOUS at 16:24

## 2020-01-01 RX ADMIN — DEXAMETHASONE SODIUM PHOSPHATE 4 MG: 4 INJECTION, SOLUTION INTRAMUSCULAR; INTRAVENOUS at 17:27

## 2020-01-01 RX ADMIN — LOSARTAN POTASSIUM 100 MG: 100 TABLET, FILM COATED ORAL at 09:41

## 2020-01-01 RX ADMIN — LOSARTAN POTASSIUM 50 MG: 50 TABLET ORAL at 08:37

## 2020-01-01 RX ADMIN — Medication 1 AMPULE: at 08:47

## 2020-01-01 RX ADMIN — Medication 10 ML: at 05:04

## 2020-01-01 RX ADMIN — LOSARTAN POTASSIUM 100 MG: 100 TABLET, FILM COATED ORAL at 08:47

## 2020-01-01 RX ADMIN — AMLODIPINE BESYLATE 10 MG: 5 TABLET ORAL at 08:47

## 2020-01-01 RX ADMIN — FAMOTIDINE 20 MG: 20 TABLET, FILM COATED ORAL at 18:00

## 2020-01-01 RX ADMIN — FAMOTIDINE 20 MG: 20 TABLET, FILM COATED ORAL at 08:46

## 2020-01-01 RX ADMIN — DEXAMETHASONE SODIUM PHOSPHATE 4 MG: 4 INJECTION, SOLUTION INTRAMUSCULAR; INTRAVENOUS at 11:43

## 2020-01-01 RX ADMIN — LOSARTAN POTASSIUM 100 MG: 100 TABLET, FILM COATED ORAL at 08:57

## 2020-01-01 RX ADMIN — MICONAZOLE NITRATE 200 MG: 200 SUPPOSITORY VAGINAL at 23:03

## 2020-01-01 RX ADMIN — FAMOTIDINE 20 MG: 20 TABLET, FILM COATED ORAL at 17:38

## 2020-01-01 RX ADMIN — DIAPER RASH SKIN PROTECTENT: at 18:18

## 2020-01-01 RX ADMIN — FAMOTIDINE 20 MG: 20 TABLET, FILM COATED ORAL at 08:57

## 2020-01-01 RX ADMIN — Medication 10 ML: at 06:37

## 2020-01-01 RX ADMIN — DIAPER RASH SKIN PROTECTENT: at 08:57

## 2020-01-01 RX ADMIN — Medication 10 ML: at 23:36

## 2020-01-01 RX ADMIN — DIAPER RASH SKIN PROTECTENT: at 21:50

## 2020-01-01 RX ADMIN — DIAPER RASH SKIN PROTECTENT: at 09:24

## 2020-01-01 RX ADMIN — FAMOTIDINE 20 MG: 20 TABLET, FILM COATED ORAL at 09:23

## 2020-01-01 RX ADMIN — LOSARTAN POTASSIUM 100 MG: 100 TABLET, FILM COATED ORAL at 09:24

## 2020-01-01 RX ADMIN — Medication 1 AMPULE: at 23:35

## 2020-01-01 RX ADMIN — ONDANSETRON HYDROCHLORIDE 4 MG: 2 INJECTION, SOLUTION INTRAMUSCULAR; INTRAVENOUS at 16:29

## 2020-01-01 RX ADMIN — LOSARTAN POTASSIUM 100 MG: 100 TABLET, FILM COATED ORAL at 09:28

## 2020-01-01 RX ADMIN — DEXAMETHASONE SODIUM PHOSPHATE 4 MG: 4 INJECTION, SOLUTION INTRAMUSCULAR; INTRAVENOUS at 05:04

## 2020-01-01 RX ADMIN — Medication 10 ML: at 19:46

## 2020-01-01 RX ADMIN — Medication 10 ML: at 22:01

## 2020-01-01 RX ADMIN — DIAPER RASH SKIN PROTECTENT: at 17:52

## 2020-01-01 RX ADMIN — Medication 10 ML: at 06:21

## 2020-01-01 RX ADMIN — KETOROLAC TROMETHAMINE 30 MG: 30 INJECTION, SOLUTION INTRAMUSCULAR at 15:20

## 2020-01-01 RX ADMIN — SODIUM CHLORIDE, SODIUM LACTATE, POTASSIUM CHLORIDE, AND CALCIUM CHLORIDE 25 ML/HR: 600; 310; 30; 20 INJECTION, SOLUTION INTRAVENOUS at 15:41

## 2020-01-01 RX ADMIN — DIAPER RASH SKIN PROTECTENT: at 21:58

## 2020-01-01 RX ADMIN — CEFTRIAXONE 1 G: 1 INJECTION, POWDER, FOR SOLUTION INTRAMUSCULAR; INTRAVENOUS at 11:52

## 2020-01-01 RX ADMIN — Medication 10 ML: at 23:03

## 2020-01-01 RX ADMIN — ENOXAPARIN SODIUM 40 MG: 40 INJECTION SUBCUTANEOUS at 09:23

## 2020-01-01 RX ADMIN — DEXAMETHASONE SODIUM PHOSPHATE 4 MG: 4 INJECTION, SOLUTION INTRAMUSCULAR; INTRAVENOUS at 23:46

## 2020-01-01 RX ADMIN — Medication 10 ML: at 14:00

## 2020-01-01 RX ADMIN — DIAPER RASH SKIN PROTECTENT: at 08:48

## 2020-01-01 RX ADMIN — ENOXAPARIN SODIUM 40 MG: 40 INJECTION SUBCUTANEOUS at 09:28

## 2020-01-01 RX ADMIN — FAMOTIDINE 20 MG: 20 TABLET, FILM COATED ORAL at 17:52

## 2020-01-01 RX ADMIN — DEXAMETHASONE SODIUM PHOSPHATE 4 MG: 4 INJECTION, SOLUTION INTRAMUSCULAR; INTRAVENOUS at 17:52

## 2020-01-01 RX ADMIN — DIAPER RASH SKIN PROTECTENT: at 13:30

## 2020-01-01 RX ADMIN — DEXAMETHASONE SODIUM PHOSPHATE 4 MG: 4 INJECTION, SOLUTION INTRAMUSCULAR; INTRAVENOUS at 00:00

## 2020-01-01 RX ADMIN — FAMOTIDINE 20 MG: 20 TABLET, FILM COATED ORAL at 18:17

## 2020-01-01 RX ADMIN — DEXAMETHASONE SODIUM PHOSPHATE 8 MG: 4 INJECTION, SOLUTION INTRAMUSCULAR; INTRAVENOUS at 16:00

## 2020-01-01 RX ADMIN — MIDAZOLAM HYDROCHLORIDE 1 MG: 1 INJECTION, SOLUTION INTRAMUSCULAR; INTRAVENOUS at 15:47

## 2020-01-01 RX ADMIN — DEXAMETHASONE SODIUM PHOSPHATE 4 MG: 4 INJECTION, SOLUTION INTRAMUSCULAR; INTRAVENOUS at 12:02

## 2020-01-01 RX ADMIN — Medication 10 ML: at 13:17

## 2020-01-01 RX ADMIN — GADOTERATE MEGLUMINE 20 ML: 376.9 INJECTION INTRAVENOUS at 14:34

## 2020-01-01 RX ADMIN — PROPOFOL 30 MG: 10 INJECTION, EMULSION INTRAVENOUS at 15:58

## 2020-01-01 RX ADMIN — SUCCINYLCHOLINE CHLORIDE 120 MG: 20 INJECTION, SOLUTION INTRAMUSCULAR; INTRAVENOUS at 15:56

## 2020-01-01 RX ADMIN — ENOXAPARIN SODIUM 40 MG: 40 INJECTION SUBCUTANEOUS at 10:18

## 2020-01-01 RX ADMIN — ENOXAPARIN SODIUM 40 MG: 40 INJECTION SUBCUTANEOUS at 08:36

## 2020-01-01 RX ADMIN — GADOTERATE MEGLUMINE 20 ML: 376.9 INJECTION INTRAVENOUS at 09:04

## 2020-01-01 RX ADMIN — DIAPER RASH SKIN PROTECTENT: at 17:39

## 2020-01-01 RX ADMIN — LIDOCAINE HYDROCHLORIDE 60 MG: 20 INJECTION, SOLUTION EPIDURAL; INFILTRATION; INTRACAUDAL; PERINEURAL at 15:56

## 2020-01-01 RX ADMIN — FENTANYL CITRATE 25 MCG: 50 INJECTION, SOLUTION INTRAMUSCULAR; INTRAVENOUS at 16:14

## 2020-01-01 RX ADMIN — Medication 10 ML: at 12:13

## 2020-01-01 RX ADMIN — CEFTRIAXONE 1 G: 1 INJECTION, POWDER, FOR SOLUTION INTRAMUSCULAR; INTRAVENOUS at 11:43

## 2020-01-01 RX ADMIN — Medication 10 ML: at 21:25

## 2020-01-01 RX ADMIN — DIAPER RASH SKIN PROTECTENT: at 08:46

## 2020-01-01 RX ADMIN — Medication 10 ML: at 21:50

## 2020-01-01 RX ADMIN — DEXAMETHASONE SODIUM PHOSPHATE 4 MG: 4 INJECTION, SOLUTION INTRAMUSCULAR; INTRAVENOUS at 23:55

## 2020-01-01 RX ADMIN — Medication 10 ML: at 06:33

## 2020-01-01 RX ADMIN — AMLODIPINE BESYLATE 10 MG: 5 TABLET ORAL at 08:46

## 2020-01-01 RX ADMIN — MORPHINE SULFATE 5 MG: 20 SOLUTION ORAL at 13:10

## 2020-01-01 RX ADMIN — DIAPER RASH SKIN PROTECTENT: at 21:05

## 2020-01-01 RX ADMIN — Medication 10 ML: at 14:41

## 2020-01-01 RX ADMIN — DEXAMETHASONE SODIUM PHOSPHATE 4 MG: 4 INJECTION, SOLUTION INTRAMUSCULAR; INTRAVENOUS at 23:35

## 2020-01-01 RX ADMIN — ENOXAPARIN SODIUM 40 MG: 40 INJECTION SUBCUTANEOUS at 08:47

## 2020-01-01 RX ADMIN — ACETAMINOPHEN 650 MG: 325 TABLET ORAL at 11:50

## 2020-01-01 RX ADMIN — DEXAMETHASONE SODIUM PHOSPHATE 4 MG: 4 INJECTION, SOLUTION INTRAMUSCULAR; INTRAVENOUS at 19:46

## 2020-01-01 RX ADMIN — Medication 10 ML: at 13:42

## 2020-01-01 RX ADMIN — ROCURONIUM BROMIDE 5 MG: 10 INJECTION INTRAVENOUS at 15:56

## 2020-01-01 RX ADMIN — CEFTRIAXONE 1 G: 1 INJECTION, POWDER, FOR SOLUTION INTRAMUSCULAR; INTRAVENOUS at 13:17

## 2020-01-01 RX ADMIN — LOSARTAN POTASSIUM 100 MG: 100 TABLET, FILM COATED ORAL at 08:46

## 2020-01-01 RX ADMIN — DEXAMETHASONE SODIUM PHOSPHATE 4 MG: 4 INJECTION, SOLUTION INTRAMUSCULAR; INTRAVENOUS at 17:38

## 2020-01-01 RX ADMIN — FAMOTIDINE 20 MG: 20 TABLET, FILM COATED ORAL at 09:41

## 2020-01-01 RX ADMIN — DEXAMETHASONE SODIUM PHOSPHATE 4 MG: 4 INJECTION, SOLUTION INTRAMUSCULAR; INTRAVENOUS at 22:00

## 2020-01-01 RX ADMIN — DEXAMETHASONE SODIUM PHOSPHATE 4 MG: 4 INJECTION, SOLUTION INTRAMUSCULAR; INTRAVENOUS at 11:52

## 2020-01-01 RX ADMIN — Medication 10 ML: at 23:46

## 2020-01-01 RX ADMIN — FAMOTIDINE 20 MG: 20 TABLET, FILM COATED ORAL at 18:07

## 2020-01-01 RX ADMIN — AMLODIPINE BESYLATE 10 MG: 5 TABLET ORAL at 08:57

## 2020-01-01 RX ADMIN — MICONAZOLE NITRATE 200 MG: 200 SUPPOSITORY VAGINAL at 21:18

## 2020-01-01 RX ADMIN — DIAPER RASH SKIN PROTECTENT: at 09:42

## 2020-01-01 RX ADMIN — FAMOTIDINE 20 MG: 20 TABLET, FILM COATED ORAL at 17:27

## 2020-01-01 RX ADMIN — DEXAMETHASONE SODIUM PHOSPHATE 4 MG: 4 INJECTION, SOLUTION INTRAMUSCULAR; INTRAVENOUS at 18:07

## 2020-01-01 RX ADMIN — DIAPER RASH SKIN PROTECTENT: at 23:48

## 2020-01-01 RX ADMIN — DEXAMETHASONE SODIUM PHOSPHATE 4 MG: 4 INJECTION, SOLUTION INTRAMUSCULAR; INTRAVENOUS at 05:36

## 2020-01-01 RX ADMIN — PROPOFOL 50 MG: 10 INJECTION, EMULSION INTRAVENOUS at 16:00

## 2020-01-01 RX ADMIN — Medication 10 ML: at 11:43

## 2020-01-01 RX ADMIN — FAMOTIDINE 20 MG: 20 TABLET, FILM COATED ORAL at 08:47

## 2020-01-01 RX ADMIN — IOPAMIDOL 100 ML: 755 INJECTION, SOLUTION INTRAVENOUS at 03:58

## 2020-01-01 RX ADMIN — Medication 10 ML: at 05:33

## 2020-01-01 RX ADMIN — DIAPER RASH SKIN PROTECTENT: at 23:09

## 2020-01-01 RX ADMIN — DEXAMETHASONE SODIUM PHOSPHATE 4 MG: 4 INJECTION, SOLUTION INTRAMUSCULAR; INTRAVENOUS at 13:17

## 2020-01-01 RX ADMIN — FAMOTIDINE 20 MG: 20 TABLET, FILM COATED ORAL at 08:37

## 2020-01-01 RX ADMIN — Medication 1 AMPULE: at 09:24

## 2020-01-01 RX ADMIN — FAMOTIDINE 20 MG: 20 TABLET, FILM COATED ORAL at 09:28

## 2020-01-01 RX ADMIN — Medication 10 ML: at 03:58

## 2020-01-01 RX ADMIN — LOSARTAN POTASSIUM 100 MG: 100 TABLET, FILM COATED ORAL at 10:17

## 2020-01-01 RX ADMIN — HYOSCYAMINE SULFATE 125 MCG: 0.12 LIQUID ORAL at 13:02

## 2020-01-01 RX ADMIN — DEXAMETHASONE SODIUM PHOSPHATE 4 MG: 4 INJECTION, SOLUTION INTRAMUSCULAR; INTRAVENOUS at 23:43

## 2020-01-01 RX ADMIN — DEXAMETHASONE SODIUM PHOSPHATE 4 MG: 4 INJECTION, SOLUTION INTRAMUSCULAR; INTRAVENOUS at 12:12

## 2020-01-01 RX ADMIN — DEXAMETHASONE SODIUM PHOSPHATE 4 MG: 4 INJECTION, SOLUTION INTRAMUSCULAR; INTRAVENOUS at 23:03

## 2020-08-31 PROBLEM — R55 CONVULSIVE SYNCOPE: Status: ACTIVE | Noted: 2020-01-01

## 2020-08-31 PROBLEM — C79.31 LUNG CANCER METASTATIC TO BRAIN (HCC): Status: ACTIVE | Noted: 2020-01-01

## 2020-08-31 PROBLEM — R41.82 ACUTE ALTERATION IN MENTAL STATUS: Status: ACTIVE | Noted: 2020-01-01

## 2020-08-31 PROBLEM — R26.1 PARAPLEGIC GAIT: Status: ACTIVE | Noted: 2020-01-01

## 2020-08-31 PROBLEM — E87.6 HYPOKALEMIA: Status: ACTIVE | Noted: 2020-01-01

## 2020-08-31 PROBLEM — C34.90 LUNG CANCER METASTATIC TO BRAIN (HCC): Status: ACTIVE | Noted: 2020-01-01

## 2020-08-31 PROBLEM — R53.1 WEAKNESS: Status: ACTIVE | Noted: 2020-01-01

## 2020-08-31 PROBLEM — R25.1 TREMOR: Status: ACTIVE | Noted: 2020-01-01

## 2020-08-31 PROBLEM — R41.0 CONFUSION: Status: ACTIVE | Noted: 2020-01-01

## 2020-08-31 PROBLEM — C70.0: Status: ACTIVE | Noted: 2020-01-01

## 2020-08-31 NOTE — PROGRESS NOTES
Chart reviewed ,  Pt seen and examined  
agree with H&P  
C/w current management Non billable round Seen earlier by my colleague

## 2020-08-31 NOTE — DISCHARGE INSTRUCTIONS
We advise you to follow-up closely with a neurologist in 2 days. You should also talk to your primary care physician about further evaluation of your weight loss symptoms.

## 2020-08-31 NOTE — ROUTINE PROCESS
TRANSFER - IN REPORT: 
 
Verbal report received from Aury Tripp [facility] (name) on Rafael Segura  being received from ER (unit) for routine progression of care Report consisted of patients Situation, Background, Assessment and  
Recommendations(SBAR). Information from the following report(s) SBAR, Kardex and Recent Results was reviewed with the receiving nurse. Opportunity for questions and clarification was provided. Assessment completed upon patients arrival to unit and care assumed.

## 2020-08-31 NOTE — PROGRESS NOTES
Problem: Falls - Risk of 
Goal: *Absence of Falls Description: Document Ketty Mccracken Fall Risk and appropriate interventions in the flowsheet. Outcome: Progressing Towards Goal 
Note: Fall Risk Interventions: 
Mobility Interventions: Bed/chair exit alarm, Communicate number of staff needed for ambulation/transfer Elimination Interventions: Bed/chair exit alarm, Call light in reach, Toileting schedule/hourly rounds History of Falls Interventions: Bed/chair exit alarm, Door open when patient unattended, Investigate reason for fall Problem: Patient Education: Go to Patient Education Activity Goal: Patient/Family Education Outcome: Progressing Towards Goal

## 2020-08-31 NOTE — ROUTINE PROCESS
Bedside, Verbal and Written shift change report given to Tala Christine RN  (oncoming nurse) by Marylen Edge, RN (offgoing nurse). Report included the following information SBAR, Kardex, MAR and Recent Results.

## 2020-08-31 NOTE — CONSULTS
Consult REFERRED BY: 
None CHIEF COMPLAINT: Weakness in the legs Subjective:  
 
Urmila George is a 60 y.o. right-handed  female seen as a new patient to me, at the request of Dr. Collette Bryan for evaluation of new problem of increasing difficulty with leg weakness, right greater than left but not associated with any significant back pain or neck pain, but with some pain radiating into her arms. Is been going on for several months, and she is had a 30 pound weight loss in addition. She had an EMG study of the right lower extremity that was unremarkable 2 weeks ago. She was found to have a large lung cancer on her x-ray today, with metastatic disease throughout the lung and lymph nodes, and may be to the liver, and her MRI scan of the brain shows leptomeningeal and intracranial lesions all consistent with her metastatic lung cancer. Cervical MRI and lumbar MRIs show some degenerative arthritis, but no significant structural brain lesions and no significant leptomeningeal enhancement. Patient denies any headache, or fever or meningismus or difficulty with any of her cranial nerves. Her laboratory studies are relatively unremarkable except for low potassium level. Her bowel and bladder function remained stable. Past Medical History:  
Diagnosis Date  Hypertension History reviewed. No pertinent surgical history. History reviewed. No pertinent family history. Social History Tobacco Use  Smoking status: Never Smoker  Smokeless tobacco: Never Used Substance Use Topics  Alcohol use: Never Frequency: Never Current Facility-Administered Medications:  
  losartan (COZAAR) tablet 50 mg, 50 mg, Oral, DAILY, DinIssac MD, 50 mg at 08/31/20 0837 
  sodium chloride (NS) flush 5-40 mL, 5-40 mL, IntraVENous, Q8H, Markos Alonzo MD, Stopped at 08/31/20 8820   sodium chloride (NS) flush 5-40 mL, 5-40 mL, IntraVENous, PRN, Markos Alonzo MD 
   acetaminophen (TYLENOL) tablet 650 mg, 650 mg, Oral, Q6H PRN **OR** acetaminophen (TYLENOL) suppository 650 mg, 650 mg, Rectal, Q6H PRN, Jennifer Alonzo MD 
  polyethylene glycol (MIRALAX) packet 17 g, 17 g, Oral, DAILY PRN, Jennifer Alonzo MD 
  promethazine (PHENERGAN) tablet 12.5 mg, 12.5 mg, Oral, Q6H PRN **OR** ondansetron (ZOFRAN) injection 4 mg, 4 mg, IntraVENous, Q6H PRN, Issac Alonzo MD 
  enoxaparin (LOVENOX) injection 40 mg, 40 mg, SubCUTAneous, DAILY, Jennifer Alonzo MD, 40 mg at 08/31/20 8314   famotidine (PEPCID) tablet 20 mg, 20 mg, Oral, BID, Jennifer Alonzo MD, 20 mg at 08/31/20 7948 Allergies Allergen Reactions  Pcn [Penicillins] Hives MRI Results (most recent): 
Results from Hospital Encounter encounter on 08/30/20 MRI LUMB SPINE W WO CONT Narrative EXAM: MRI LUMB SPINE W WO CONT INDICATION: generalized weakness more in extremities for few months  but 
worsening. Lung cancer COMPARISON: None TECHNIQUE: MR imaging of the lumbar spine was performed using the following 
sequences: sagittal T1, T2, STIR;  axial T1, T2 prior to and following contrast 
administration. CONTRAST: 20 mL of Dotarem. FINDINGS: 
 
There is normal alignment of the lumbar spine. Vertebral body heights are 
maintained. Chronic degenerative fatty change is seen at the L4-5 endplates. No 
fracture. There is a 1.7 cm lesion in the right side of L3 with mildly decreased T1 and T2 signal. There is a subtle ring of enhancement. There are several 
scattered similar lesions in the sacrum. There is a 9 mm lesion in the posterior 
right iliac bone. There are several scattered incidental intraosseous lipomas 
both increased T1 and T2 signal. The most prominent one is seen in T11. The conus medullaris terminates at T12. Signal and caliber of the distal spinal 
cord are within normal limits. There is no pathologic intrathecal enhancement. The paraspinal soft tissues are within normal limits. Lower thoracic spine: No herniation or stenosis. L1-L2: No herniation or stenosis. L2-L3: Mild disc space narrowing. Mild broad-based disc bulge that is slightly 
asymmetric towards the left. There is mild spinal stenosis with greater 
impingement left lateral recess. There is mild left neural foraminal narrowing. L3-L4: Mild disc space narrowing. Mild broad-based disc bulge causing mild 
spinal stenosis. No significant neural foraminal narrowing. L4-L5: Severe disc space narrowing. Minimal broad-based disc osteophyte complex 
causing minimal spinal stenosis. There is mild left and moderate right neural 
foraminal narrowing. L5-S1: No herniation or stenosis. Mild bilateral posterior facet arthropathy. Impression IMPRESSION: 
 
1. Several scattered osseous metastases. 2. Multilevel spondylosis as above. [place] from [facility] [place] on 08/30/20 MRI LUMB SPINE W WO CONT Narrative EXAM: MRI LUMB SPINE W WO CONT INDICATION: generalized weakness more in extremities for few months  but 
worsening. Lung cancer COMPARISON: None TECHNIQUE: MR imaging of the lumbar spine was performed using the following 
sequences: sagittal T1, T2, STIR;  axial T1, T2 prior to and following contrast 
administration. CONTRAST: 20 mL of Dotarem. FINDINGS: 
 
There is normal alignment of the lumbar spine. Vertebral body heights are 
maintained. Chronic degenerative fatty change is seen at the L4-5 endplates. No 
fracture. There is a 1.7 cm lesion in the right side of L3 with mildly decreased T1 and T2 signal. There is a subtle ring of enhancement. There are several 
scattered similar lesions in the sacrum. There is a 9 mm lesion in the posterior 
right iliac bone. There are several scattered incidental intraosseous lipomas 
both increased T1 and T2 signal. The most prominent one is seen in T11. The conus medullaris terminates at T12.  Signal and caliber of the distal spinal 
 cord are within normal limits. There is no pathologic intrathecal enhancement. The paraspinal soft tissues are within normal limits. Lower thoracic spine: No herniation or stenosis. L1-L2: No herniation or stenosis. L2-L3: Mild disc space narrowing. Mild broad-based disc bulge that is slightly 
asymmetric towards the left. There is mild spinal stenosis with greater 
impingement left lateral recess. There is mild left neural foraminal narrowing. L3-L4: Mild disc space narrowing. Mild broad-based disc bulge causing mild 
spinal stenosis. No significant neural foraminal narrowing. L4-L5: Severe disc space narrowing. Minimal broad-based disc osteophyte complex 
causing minimal spinal stenosis. There is mild left and moderate right neural 
foraminal narrowing. L5-S1: No herniation or stenosis. Mild bilateral posterior facet arthropathy. Impression IMPRESSION: 
 
1. Several scattered osseous metastases. 2. Multilevel spondylosis as above. Review of Systems: A comprehensive review of systems was negative except for: Constitutional: positive for fatigue, malaise and weight loss Musculoskeletal: positive for myalgias, arthralgias, stiff joints and muscle weakness Neurological: positive for coordination problems, gait problems and weakness Vitals:  
 08/31/20 0700 08/31/20 0836 08/31/20 1100 08/31/20 1537 BP: 163/65 (!) 177/96 (!) 158/93 173/81 Pulse: 90 92 100 86 Resp: 20  21 20 Temp: 97.3 °F (36.3 °C)  97.8 °F (36.6 °C) 98.1 °F (36.7 °C) SpO2: 98%  97% 98% Weight:      
Height:      
 
Objective: I 
 
 
NEUROLOGICAL EXAM: 
 
Appearance: The patient is well developed, well nourished, provides a coherent history and is in no acute distress. Mental Status: Oriented to time, place and person, and the president, cognitive function is a bit slow and mildly abnormal and speech is fluent and no aphasia or dysarthria. Mood and affect appropriate but very anxious. Cranial Nerves:   Intact visual fields. Fundi are benign, disc are flat, no lesions seen on funduscopy. YESENIA, EOM's full, no nystagmus, no ptosis. Facial sensation is normal. Corneal reflexes are not tested. Facial movement is symmetric. Hearing is normal bilaterally. Palate is midline with normal sternocleidomastoid and trapezius muscles are normal. Tongue is midline. Neck without meningismus or bruits Temporal arteries are not tender or enlarged TMJ areas are not tender on palpation Motor:  5/5 strength in upper proximal and distal muscles, but strength in the right leg is about 3/5 and in the left leg about 4/5. Normal bulk and tone. No fasciculations. Rapid alternating movement is intact on the left and a bit slow on the right Reflexes:   Deep tendon reflexes 2+/4 on the left and and 3+/4 on the right. No babinski or clonus present Sensory:   Normal to touch, pinprick and vibration and temperature. DSS is intact Gait:  Not tested. Tremor:   No tremor noted. Cerebellar:  Not tested cerebellar signs on Romberg and tandem testing and finger-nose-finger exam.  
Neurovascular:  Normal heart sounds and regular rhythm, peripheral pulses intact, and no carotid bruits. Assessment: ICD-10-CM ICD-9-CM 1. Bilateral leg weakness  R29.898 729.89   
2. Weight loss  R63.4 783.21   
3. Fall in home, initial encounter  W19. Beba Quick O057.3 Y92.009 E849.0 Active Problems: Hypokalemia (8/31/2020) Confusion (8/31/2020) Tremor (8/31/2020) Weakness (8/31/2020) Lung cancer metastatic to brain [facility]) (8/31/2020) Carcinoma of cerebral meninges ([facility] [address] (8/31/2020) Paraplegic gait (8/31/2020) Acute alteration in mental status (8/31/2020) Plan:  
 
Patient has leptomeningeal and intracranial metastatic disease, most likely causing her symptoms, and the right leg is probably more involved in the left because of a left pontine lesion. We will start steroids, and she will need a biopsy and radiation therapy consults once we have a tissue diagnosis We will check an MRI of the thoracic spine just to make sure there is no cord lesion there. Tragic case Signed By: Lord Amber MD   
 August 31, 2020 CC: None FAX: None 
 5 PM

## 2020-08-31 NOTE — ED NOTES
Pt arrived via EMS. Pt has had multiple falls x 2 months while feeling progressively weak. Pt reporting she is having trouble getting her thoughts into actions. Pt stated she is having difficulty communicating to her limbs to preform the actions. Glucose 128 per EMS. Pt able to move all 4 extremities at will.

## 2020-08-31 NOTE — PROGRESS NOTES
Hospitalist Progress Note NAME: Francy Santa :  1955 MRN:  [id number] Assessment / Plan: 
 
69-year-old female complaining of a few months of generalized weakness, frequent falls, extremity weakness especially in the legs, weight loss, confusion and forgetfulness. On exam Mild tremor.  4/5 strength bilateral lower extremities. Of note she just had a recent head CT  Head on   which was normal, just showed microvascular changes.  Her H&P is not at all suggestive of acute CVA, no indication for CT angiography at this time. Labs reassuring Except k 3.3 . Per patient she also lost almost 30 pounds of weight in last couple of month because she did not feel like eating. Patient has history of gastric sleeve for weight loss 4 years ago. Patient denied to be depressed. 
  
  
Generalized weaknesss more in Bilateral leg weakness( intermittent) poa Weight loss poa Mild tremors Concern for metastatic lung ca Nonfocal exam. Progressive symptoms for months. CT scan a couple weeks ago nonspecific. MRI c spine No acute abnormality. 2. Mild spinal stenosis at C4-5. Neural foraminal narrowing as above. RI cervical spine : 
 
 MRI  Lumbar spine showed : 
1. Several scattered osseous metastases. 2. Multilevel spondylosis as above MRI brain : 
1. Extensive leptomeningeal enhancement as well as several subtle areas of 
parenchymal enhancement, highly suspicious for metastatic disease given findings 
on chest CT earlier today suspicious for bronchogenic carcinoma. 2. Extensive chronic white matter disease and areas of remote infarction, with 
no acute intracranial hemorrhage or infarction. - 
- tsh wnl hiv b12 folate wnl   rpr non reactive  ua utox negative    cpk wnl   a1c 5.3 FLP 
-We will check zinc and selenium as patient has history of gastric sleeve operation to rule out deficiency 
-neuro consulted 
-pt/ot consulted Ct scan chest : 
IMPRESSION: Advanced lung carcinoma: 1. Large left upper lobe lung carcinoma. 2. Numerous jany metastases, including contralateral mediastinal and 
ipsilateral supraclavicular. 3. Diffuse, bilateral, pulmonary metastases. 4. Scattered osseous metastases. 5. Few hepatic metastases. CT chest : 
 
 consult [facility] 
  
Hypokalemia- repleted  Monitor   Mag wnl . 
  
Uncontrolled HTN-172/91  Resume home meds  And give iv hydralazine prn 
  
  
Code Status: full Surrogate Decision Maker: Myrna Naranjo  Spouse  [phone]   
  
  
  
DVT Prophylaxis: sq lovenox GI Prophylaxis: not indicated 
  
Baseline: independent 30.0 - 39.9 Obese / Body mass index is 33.97 kg/m². Subjective: Chief Complaint / Reason for Physician Visit FU leg weakness . Weakness for fwe weeks , with numbness  LE . Flat affect   Discussed with RN events overnight. Review of Systems: 
Symptom Y/N Comments  Symptom Y/N Comments Fever/Chills n   Chest Pain n   
Poor Appetite    Edema Cough n   Abdominal Pain n   
Sputum n   Joint Pain SOB/MEZA    Pruritis/Rash Nausea/vomit n   Tolerating PT/OT Diarrhea    Tolerating Diet y Constipation    Other Could NOT obtain due to:   
 
Objective: VITALS:  
Last 24hrs VS reviewed since prior progress note. Most recent are: 
Patient Vitals for the past 24 hrs: 
 Temp Pulse Resp BP SpO2  
08/31/20 1537 98.1 °F (36.7 °C) 86 20 173/81 98 % 08/31/20 1100 97.8 °F (36.6 °C) 100 21 (!) 158/93 97 % 08/31/20 0836  92  (!) 177/96   
08/31/20 0700 97.3 °F (36.3 °C) 90 20 163/65 98 % 08/31/20 0330 98.5 °F (36.9 °C) 98 29 168/80 98 % 08/30/20 2123 98.2 °F (36.8 °C) (!) 101 25 (!) 172/91 97 % Intake/Output Summary (Last 24 hours) at 8/31/2020 1831 Last data filed at 8/31/2020 1346 Gross per 24 hour Intake 1000 ml Output 400 ml Net 600 ml PHYSICAL EXAM: 
General: WD, WN. Alert, cooperative, no acute distress   
EENT:  EOMI. Anicteric sclerae. MMM Resp: CTA bilaterally, no wheezing or rales. No accessory muscle use CV:  Regular  rhythm,  No edema GI:  Soft, Non distended, Non tender.  +Bowel sounds Neurologic:  Alert and oriented X 3, normal speech, Psych:   Good insight. Not anxious nor agitated Skin:  No rashes. No jaundice Reviewed most current lab test results and cultures  YES Reviewed most current radiology test results   YES Review and summation of old records today    NO Reviewed patient's current orders and MAR    YES 
PMH/SH reviewed - no change compared to H&P 
________________________________________________________________________ Care Plan discussed with: 
  Comments Patient x Family RN x Care Manager Consultant Multidiciplinary team rounds were held today with , nursing, pharmacist and clinical coordinator. Patient's plan of care was discussed; medications were reviewed and discharge planning was addressed. ________________________________________________________________________ Total NON critical care TIME: 35   Minutes Total CRITICAL CARE TIME Spent:   Minutes non procedure based Comments >50% of visit spent in counseling and coordination of care    
________________________________________________________________________ Lupe Albert MD  
 
Procedures: see electronic medical records for all procedures/Xrays and details which were not copied into this note but were reviewed prior to creation of Plan. LABS: 
I reviewed today's most current labs and imaging studies. Pertinent labs include: 
Recent Labs 08/31/20 
0232 08/30/20 
2227 WBC 10.4 9.3 HGB 12.8 13.7 HCT 39.9 42.8  235 Recent Labs 08/31/20 
0232 08/30/20 
2227 * 134* K 3.2* 3.3*  
 98 CO2 30 33* * 126* BUN 19 21* CREA 0.53* 0.59 CA 8.3* 9.1 MG 2.1  --   
ALB 2.8* 3.0* TBILI 0.9 1.0 ALT 60 65 INR 1.2*  --   
 
 
 Signed: Aston Johnson MD

## 2020-08-31 NOTE — ED NOTES
9562 Assumed care of pt. Pt placed on a pure wick. Plan of care discussed. Call bell in reach. Will continue to monitor. 03:43  Pt pulled up in bed. Warm blankets provided. Call bell in reach. Lights dimmed for comfort. Will continue to monitor. 0700  Pt resting in bed with eyes closed. Will continue to monitor. 08:29  MRI called states they will send for pt.  
 
09:44  Urine sent to lab. 11:07  Dietary called for missing breakfast tray, states they will send early lunch. 11:54 Blood drawn from RAC, 10 cc wasted. 13:19  Pt to MRI via stretcher. 13:51  Water provided to spouse while pt is off the floor for testing. 14:42  Attempting to call report at this time. 14:53  TRANSFER - OUT REPORT: 
 
Verbal report given to Denver Poag (name) on Ulysses Smith  being transferred to [facility] (unit) for routine progression of care Report consisted of patients Situation, Background, Assessment and  
Recommendations(SBAR). Information from the following report(s) SBAR, MAR, Recent Results and Cardiac Rhythm SR  Ocassional PVC'S was reviewed with the receiving nurse. Lines:  
Peripheral IV 08/30/20 Right Antecubital (Active) Opportunity for questions and clarification was provided. Patient transported with: 
 Tech from MRI 
 
 
14:54   provided with codes from registration, he took pt's glasses with him.

## 2020-08-31 NOTE — H&P
Hospitalist Admission Note NAME: Masoud Irby :  1955 MRN:  [id number] Date/Time:  2020 11:57 PM 
 
Patient PCP: None 
_____________________________________________________________________ Given the patient's current clinical presentation, I have a high level of concern for decompensation if discharged from the emergency department. Complex decision making was performed, which includes reviewing the patient's available past medical records, laboratory results, and x-ray films. My assessment of this patient's clinical condition and my plan of care is as follows. Assessment / Plan: 
80-year-old female complaining of a few months of generalized weakness, frequent falls, extremity weakness especially in the legs, weight loss, confusion and forgetfulness. On exam Mild tremor. 4/5 strength bilateral lower extremities. Of note she just had a recent head CT  Head on   which was normal, just showed microvascular changes. Her H&P is not at all suggestive of acute CVA, no indication for CT angiography at this time. Labs reassuring Except k 3.3 . Per patient she also lost almost 30 pounds of weight in last couple of month because she did not feel like eating. Patient has history of gastric sleeve for weight loss 4 years ago. Patient denied to be depressed. Generalized weaknesss more in Bilateral leg weakness( intermittent) poa Weight loss poa Mild tremors Nonfocal exam. Progressive symptoms for months. CT scan a couple weeks ago nonspecific.  
 
-Admit to for further work up and pt/ot  
-MRI [facility] 
-Check tsh hiv b12 folate  rpr  ua utox   cpk  a1c FLP 
-We will check zinc and selenium as patient has history of gastric sleeve operation to rule out deficiency 
-neuro consulted 
-pt/ot consulted Hypokalemia- repleted  Monitor  Check Mag. Uncontrolled HTN-172/91  Resume home meds  And give iv hydralazine prn Code Status: full Surrogate Decision Maker: Caro [place]  Spouse  [phone] DVT Prophylaxis: sq lovenox GI Prophylaxis: not indicated Baseline: independent Subjective: CHIEF COMPLAINT:  Generalized weakness and frequent falls HISTORY OF PRESENT ILLNESS:    
Megan Calix, 60 y.o. female  with past medical history of hypertension and history of gastric sleeve operation for weight loss 4 years ago complains of generalized weakness and frequent falls, worsening over the past 2 to 3 months, gradual onset. She has had some intermittent weakness in her extremities, especially right lower extremity, some numbness of the right foot. She has an appointment coming up with a neurologist in 2 days. No headaches. She has also had some mild intermittent confusion and forgetfulness. Pt reporting she is having trouble getting her thoughts into actions. Pt stated she is having difficulty communicating to her limbs to preform the actions. Glucose 128 per EMS Tonight she had a minor fall at home, was unable to stand up, and called 911 for assistance Per patient she is scheduled to be seen by a neurologist not from our facility tomorrow to find out what is going on with her. We were asked to admit for work up and evaluation of the above problems. 8/18/2020  EMG Kellie Paris MD     8/18/2020  9:49 AM   
EMG   
Performed by: Kellie Paris MD  Authorized by: Jayme Bahena MD    
Consent Given by:  Patient   
   
Electromyography:    
 Site:  Right Leg   
 Number of right leg muscles studied:  5 or more   
Nerve Conduction:    
 Nerves tested:  3-4   
Findings/Interpretation:    
  Electrodiagnostic Findings   
1) Nerve conduction studies of the right lower extremity were essentially    
normal.   
2) Needle EMG of the right lower extremity and lumbar paraspinals was    
normal.   
   
Electrodiagnostic Impression   
Normal exam.  No evidence of peripheral neuropathy/impingement or active    
 lumbar radiculopathy.   
   
 
X-ray lumbar spine 2 or 3 views (14917) (06/26/2020 9:56 AM EDT) X-ray lumbar spine 2 or 3 views (62818) (06/26/2020 9:56 AM EDT) Impressions Performed At X-rays of the lumbar spine show degenerative changes throughout with some    
calcifications of the abdominal aorta.  No fracture dislocation.   IMAGING    
 
 
 
 
 
Past Medical History:  
Diagnosis Date  Hypertension History reviewed. No pertinent surgical history. History of gastric sleeve operation Social History Tobacco Use  Smoking status: Never Smoker  Smokeless tobacco: Never Used Substance Use Topics  Alcohol use: Never Frequency: Never History reviewed. No pertinent family history. Allergies Allergen Reactions  Pcn [Penicillins] Hives Prior to Admission medications Medication Sig Start Date End Date Taking? Authorizing Provider  
losartan (COZAAR) 50 mg tablet Take 50 mg by mouth daily. Yes Other, MD Timothy  
 
 
REVIEW OF SYSTEMS:    
I am not able to complete the review of systems because: The patient is intubated and sedated The patient has altered mental status due to his acute medical problems The patient has baseline aphasia from prior stroke(s) The patient has baseline dementia and is not reliable historian The patient is in acute medical distress and unable to provide information Constitutional: Positive for fatigue and unexpected weight change. Negative for fever. Respiratory: Negative for shortness of breath. Cardiovascular: Negative for chest pain. Gastrointestinal: Negative for abdominal pain. Neurological: Positive for weakness. Negative for seizures, syncope, light-headedness and headaches. All other systems reviewed and are negative. Objective: VITALS:   
Visit Vitals BP (!) 172/91 (BP 1 Location: Left arm, BP Patient Position: At rest) Pulse (!) 101 Temp 98.2 °F (36.8 °C) Resp 25  
 Ht 5' 9\" (1.753 m) Wt 104.3 kg (230 lb) SpO2 97% BMI 33.97 kg/m² PHYSICAL EXAM: 
 
 Constitutional:   
   General: She is not in acute distress. Appearance: Normal appearance. She is well-developed. She is not ill-appearing. HENT:  
   Head: Normocephalic and atraumatic. Mouth/Throat:  
   Mouth: Mucous membranes are moist.  
Eyes:  
   General:     
   Right eye: No discharge. Left eye: No discharge. Conjunctiva/sclera: Conjunctivae normal.  
Neck: Musculoskeletal: Normal range of motion and neck supple. Cardiovascular:  
   Rate and Rhythm: Normal rate and regular rhythm. Heart sounds: Normal heart sounds. No murmur. Pulmonary:  
   Effort: Pulmonary effort is normal. No respiratory distress. Breath sounds: Normal breath sounds. No wheezing. Abdominal:  
   General: There is no distension. Palpations: Abdomen is soft. Tenderness: There is no abdominal tenderness. Musculoskeletal: Normal range of motion. General: No deformity. Skin: 
   General: Skin is warm and dry. Findings: No rash. Neurological:  
   General: No focal deficit present. Mental Status: She is alert and oriented to person, place, and time. GCS: GCS eye subscore is 4. GCS verbal subscore is 5. GCS motor subscore is 6. Cranial Nerves: Cranial nerves are intact. No cranial nerve deficit or facial asymmetry. Motor: Weakness and tremor present. Coordination: Finger-Nose-Finger Test normal.  
   Comments: Constitutional:   
   General: She is not in acute distress. Appearance: Normal appearance. She is well-developed. She is not ill-appearing. HENT:  
   Head: Normocephalic and atraumatic. Mouth/Throat:  
   Mouth: Mucous membranes are moist.  
Eyes:  
   General:     
   Right eye: No discharge. Left eye: No discharge. Conjunctiva/sclera: Conjunctivae normal.  
Neck: Musculoskeletal: Normal range of motion and neck supple. Cardiovascular:  
   Rate and Rhythm: Normal rate and regular rhythm. Heart sounds: Normal heart sounds. No murmur. Pulmonary:  
   Effort: Pulmonary effort is normal. No respiratory distress. Breath sounds: Normal breath sounds. No wheezing. Abdominal:  
   General: There is no distension. Palpations: Abdomen is soft. Tenderness: There is no abdominal tenderness. Musculoskeletal: Normal range of motion. General: No deformity. Skin: 
   General: Skin is warm and dry. Findings: No rash. Neurological:  
   General: No focal deficit present. Mental Status: She is alert and oriented to person, place, and time. GCS: GCS eye subscore is 4. GCS verbal subscore is 5. GCS motor subscore is 6. Cranial Nerves: Cranial nerves are intact. No cranial nerve deficit or facial asymmetry. Motor: Weakness and tremor present. Coordination: Finger-Nose-Finger Test normal.  
   Comments: 4/5 strength bilateral lower extremities Constitutional:   
   General: She is not in acute distress. Appearance: Normal appearance. She is well-developed. She is not ill-appearing. HENT:  
   Head: Normocephalic and atraumatic. Mouth/Throat:  
   Mouth: Mucous membranes are moist.  
Eyes:  
   General:     
   Right eye: No discharge. Left eye: No discharge. Conjunctiva/sclera: Conjunctivae normal.  
Neck: Musculoskeletal: Normal range of motion and neck supple. Cardiovascular:  
   Rate and Rhythm: Normal rate and regular rhythm. Heart sounds: Normal heart sounds. No murmur. Pulmonary:  
   Effort: Pulmonary effort is normal. No respiratory distress. Breath sounds: Normal breath sounds. No wheezing. Abdominal:  
   General: There is no distension. Palpations: Abdomen is soft. Tenderness: There is no abdominal tenderness. Musculoskeletal: Normal range of motion. General: No deformity. Skin: 
   General: Skin is warm and dry. Findings: No rash. Neurological:  
   General: No focal deficit present. Mental Status: She is alert and oriented to person, place, and time. GCS: GCS eye subscore is 4. GCS verbal subscore is 5. GCS motor subscore is 6. Cranial Nerves: Cranial nerves are intact. No cranial nerve deficit or facial asymmetry. Motor: Weakness and tremor present. Coordination: Finger-Nose-Finger Test normal.  
   Comments: 4/5 strength bilateral lower extremities Mild nonspecific tremor Psychiatric:     
   Speech: Speech normal.     
   Behavior: Behavior normal.     
   Cognition and Memory: Cognition normal.  
 
 
 
_______________________________________________________________________ Care Plan discussed with: 
  Comments Patient y Family RN y   
Care Manager Consultant:  elizabeth Ed md   
_______________________________________________________________________ Expected  Disposition:  
Home with Family y HH/PT/OT/RN   
SNF/LTC   
[facility]   
________________________________________________________________________ TOTAL TIME: 60   Minutes Critical Care Provided     Minutes non procedure based Comments  
 y Reviewed previous records  
>50% of visit spent in counseling and coordination of care y Discussion with patient and/or family and questions answered Given the patient's current clinical presentation, I have a high level of concern for decompensation if discharged from the ED. Complex decision making was performed which includes reviewing the patient's available past medical records, laboratory results, and Xray films. I have also directly communicated my plan and discussed this case with the involved ED physician.  
 
____________________________________________________________________ Maricarmen Santos MD 
 
Procedures: see electronic medical records for all procedures/Xrays and details which were not copied into this note but were reviewed prior to creation of Plan. LAB DATA REVIEWED:   
Recent Results (from the past 24 hour(s)) CBC WITH AUTOMATED DIFF Collection Time: 08/30/20 10:27 PM  
Result Value Ref Range WBC 9.3 3.6 - 11.0 K/uL  
 RBC 5.24 (H) 3.80 - 5.20 M/uL  
 HGB 13.7 11.5 - 16.0 g/dL HCT 42.8 35.0 - 47.0 % MCV 81.7 80.0 - 99.0 FL  
 MCH 26.1 26.0 - 34.0 PG  
 MCHC 32.0 30.0 - 36.5 g/dL  
 RDW 15.4 (H) 11.5 - 14.5 % PLATELET 608 778 - 983 K/uL MPV 10.5 8.9 - 12.9 FL  
 NRBC 0.0 0  WBC ABSOLUTE NRBC 0.00 0.00 - 0.01 K/uL NEUTROPHILS 76 (H) 32 - 75 % LYMPHOCYTES 11 (L) 12 - 49 % MONOCYTES 12 5 - 13 % EOSINOPHILS 1 0 - 7 % BASOPHILS 0 0 - 1 % IMMATURE GRANULOCYTES 0 0.0 - 0.5 % ABS. NEUTROPHILS 7.0 1.8 - 8.0 K/UL  
 ABS. LYMPHOCYTES 1.0 0.8 - 3.5 K/UL  
 ABS. MONOCYTES 1.1 (H) 0.0 - 1.0 K/UL  
 ABS. EOSINOPHILS 0.1 0.0 - 0.4 K/UL  
 ABS. BASOPHILS 0.0 0.0 - 0.1 K/UL  
 ABS. IMM. GRANS. 0.0 0.00 - 0.04 K/UL  
 DF AUTOMATED METABOLIC PANEL, COMPREHENSIVE Collection Time: 08/30/20 10:27 PM  
Result Value Ref Range Sodium 134 (L) 136 - 145 mmol/L Potassium 3.3 (L) 3.5 - 5.1 mmol/L Chloride 98 97 - 108 mmol/L  
 CO2 33 (H) 21 - 32 mmol/L Anion gap 3 (L) 5 - 15 mmol/L Glucose 126 (H) 65 - 100 mg/dL BUN 21 (H) 6 - 20 MG/DL Creatinine 0.59 0.55 - 1.02 MG/DL  
 BUN/Creatinine ratio 36 (H) 12 - 20 GFR est AA >60 >60 ml/min/1.73m2 GFR est non-AA >60 >60 ml/min/1.73m2 Calcium 9.1 8.5 - 10.1 MG/DL Bilirubin, total 1.0 0.2 - 1.0 MG/DL  
 ALT (SGPT) 65 12 - 78 U/L  
 AST (SGOT) 46 (H) 15 - 37 U/L Alk. phosphatase 106 45 - 117 U/L Protein, total 6.9 6.4 - 8.2 g/dL Albumin 3.0 (L) 3.5 - 5.0 g/dL Globulin 3.9 2.0 - 4.0 g/dL A-G Ratio 0.8 (L) 1.1 - 2.2 CK Collection Time: 08/30/20 10:27 PM  
Result Value Ref Range   26 - 192 U/L

## 2020-08-31 NOTE — ED PROVIDER NOTES
EMERGENCY DEPARTMENT HISTORY AND PHYSICAL EXAM 
 
 
Date: 8/30/2020 Patient Name: Megan Calix Patient Age and Sex: 60 y.o. female History of Presenting Illness Chief Complaint Patient presents with  Extremity Weakness History Provided By: Patient Ability to gather history was limited by: HPI: Megan Calix, 60 y.o. female complains of generalized weakness and frequent falls, worsening over the past 2 to 3 months, gradual onset. She has had some intermittent weakness in her extremities, especially right lower extremity, some numbness of the right foot. She has an appointment coming up with a neurologist in 2 days. No headaches. She has also had some mild intermittent confusion and forgetfulness. Tonight she had a minor fall at home, was unable to stand up, and called 911 for assistance. Location:   
Quality:     
Severity:   
Duration:  
Timing:     
Context:   
Modifying factors:  
Associated symptoms:  
 
 
The patient's medical, surgical, family, and social history on file were reviewed by me today. Past Medical History:  
Diagnosis Date  Hypertension History reviewed. No pertinent surgical history. PCP: None Past History Past Medical History: 
Past Medical History:  
Diagnosis Date  Hypertension Past Surgical History: 
History reviewed. No pertinent surgical history. Family History: 
History reviewed. No pertinent family history. Social History: 
Social History Tobacco Use  Smoking status: Never Smoker  Smokeless tobacco: Never Used Substance Use Topics  Alcohol use: Never Frequency: Never  Drug use: Never Allergies: Allergies Allergen Reactions  Pcn [Penicillins] Hives Current Medications: No current facility-administered medications on file prior to encounter. Current Outpatient Medications on File Prior to Encounter Medication Sig Dispense Refill  losartan (COZAAR) 50 mg tablet Take 50 mg by mouth daily. Review of Systems Review of Systems Constitutional: Positive for fatigue and unexpected weight change. Negative for fever. Respiratory: Negative for shortness of breath. Cardiovascular: Negative for chest pain. Gastrointestinal: Negative for abdominal pain. Neurological: Positive for weakness. Negative for seizures, syncope, light-headedness and headaches. All other systems reviewed and are negative. Physical Exam  
Vital Signs Patient Vitals for the past 8 hrs: 
 Temp Pulse Resp BP SpO2  
08/30/20 2123 98.2 °F (36.8 °C) (!) 101 25 (!) 172/91 97 % Physical Exam 
Vitals signs and nursing note reviewed. Constitutional:   
   General: She is not in acute distress. Appearance: Normal appearance. She is well-developed. She is not ill-appearing. HENT:  
   Head: Normocephalic and atraumatic. Mouth/Throat:  
   Mouth: Mucous membranes are moist.  
Eyes:  
   General:     
   Right eye: No discharge. Left eye: No discharge. Conjunctiva/sclera: Conjunctivae normal.  
Neck: Musculoskeletal: Normal range of motion and neck supple. Cardiovascular:  
   Rate and Rhythm: Normal rate and regular rhythm. Heart sounds: Normal heart sounds. No murmur. Pulmonary:  
   Effort: Pulmonary effort is normal. No respiratory distress. Breath sounds: Normal breath sounds. No wheezing. Abdominal:  
   General: There is no distension. Palpations: Abdomen is soft. Tenderness: There is no abdominal tenderness. Musculoskeletal: Normal range of motion. General: No deformity. Skin: 
   General: Skin is warm and dry. Findings: No rash. Neurological:  
   General: No focal deficit present. Mental Status: She is alert and oriented to person, place, and time. GCS: GCS eye subscore is 4. GCS verbal subscore is 5. GCS motor subscore is 6. Cranial Nerves: Cranial nerves are intact. No cranial nerve deficit or facial asymmetry. Motor: Weakness and tremor present. Coordination: Finger-Nose-Finger Test normal.  
   Comments: 4/5 strength bilateral lower extremities Mild nonspecific tremor Psychiatric:     
   Speech: Speech normal.     
   Behavior: Behavior normal.     
   Cognition and Memory: Cognition normal.  
 
 
 
Diagnostic Study Results Labs Recent Results (from the past 24 hour(s)) CBC WITH AUTOMATED DIFF Collection Time: 08/30/20 10:27 PM  
Result Value Ref Range WBC 9.3 3.6 - 11.0 K/uL  
 RBC 5.24 (H) 3.80 - 5.20 M/uL  
 HGB 13.7 11.5 - 16.0 g/dL HCT 42.8 35.0 - 47.0 % MCV 81.7 80.0 - 99.0 FL  
 MCH 26.1 26.0 - 34.0 PG  
 MCHC 32.0 30.0 - 36.5 g/dL  
 RDW 15.4 (H) 11.5 - 14.5 % PLATELET 873 164 - 908 K/uL MPV 10.5 8.9 - 12.9 FL  
 NRBC 0.0 0  WBC ABSOLUTE NRBC 0.00 0.00 - 0.01 K/uL NEUTROPHILS 76 (H) 32 - 75 % LYMPHOCYTES 11 (L) 12 - 49 % MONOCYTES 12 5 - 13 % EOSINOPHILS 1 0 - 7 % BASOPHILS 0 0 - 1 % IMMATURE GRANULOCYTES 0 0.0 - 0.5 % ABS. NEUTROPHILS 7.0 1.8 - 8.0 K/UL  
 ABS. LYMPHOCYTES 1.0 0.8 - 3.5 K/UL  
 ABS. MONOCYTES 1.1 (H) 0.0 - 1.0 K/UL  
 ABS. EOSINOPHILS 0.1 0.0 - 0.4 K/UL  
 ABS. BASOPHILS 0.0 0.0 - 0.1 K/UL  
 ABS. IMM. GRANS. 0.0 0.00 - 0.04 K/UL  
 DF AUTOMATED METABOLIC PANEL, COMPREHENSIVE Collection Time: 08/30/20 10:27 PM  
Result Value Ref Range Sodium 134 (L) 136 - 145 mmol/L Potassium 3.3 (L) 3.5 - 5.1 mmol/L Chloride 98 97 - 108 mmol/L  
 CO2 33 (H) 21 - 32 mmol/L Anion gap 3 (L) 5 - 15 mmol/L Glucose 126 (H) 65 - 100 mg/dL BUN 21 (H) 6 - 20 MG/DL Creatinine 0.59 0.55 - 1.02 MG/DL  
 BUN/Creatinine ratio 36 (H) 12 - 20 GFR est AA >60 >60 ml/min/1.73m2 GFR est non-AA >60 >60 ml/min/1.73m2 Calcium 9.1 8.5 - 10.1 MG/DL  Bilirubin, total 1.0 0.2 - 1.0 MG/DL  
 ALT (SGPT) 65 12 - 78 U/L  
 AST (SGOT) 46 (H) 15 - 37 U/L Alk. phosphatase 106 45 - 117 U/L Protein, total 6.9 6.4 - 8.2 g/dL Albumin 3.0 (L) 3.5 - 5.0 g/dL Globulin 3.9 2.0 - 4.0 g/dL A-G Ratio 0.8 (L) 1.1 - 2.2 CK Collection Time: 08/30/20 10:27 PM  
Result Value Ref Range  26 - 192 U/L Radiologic Studies No orders to display CT Results  (Last 48 hours) None CXR Results  (Last 48 hours) None Procedures Procedures Medical Decision Making I reviewed the patient's most recent Emergency Dept notes and diagnostic tests 
in formulating my MDM on today's visit. Provider Notes (Medical Decision Making):  
73-year-old female complaining of a few months of generalized weakness, frequent falls, extremity weakness especially in the legs, weight loss, confusion and forgetfulness. No pain, no headaches. On exam she has no distress. Mild tremor. 4/5 strength bilateral lower extremities. Of note she just had a recent head CT a couple weeks ago which was normal, just showed microvascular changes. Her H&P is not at all suggestive of acute CVA, no indication for CT angiography at this time. We will check basic laboratories for metabolic derangement. She likely needs outpatient brain MRI, possibly spinal cord MRI and neurology consult. Lori Mckoy MD 
10:04 PM 
 
Labs reassuring. Nonfocal exam.  Progressive symptoms for months. CT scan a couple weeks ago nonspecific. Has Neurology f/u in 2 days. Millicent Krishnan MD 
 
Patient and her  do not feel comfortable taking her home. They state that she is having too many frequent falls, is unable to get out of bed and toilet safely, has no one to help her ambulate or get off the floor. The  is physically frail and unable to assist her. Admit for PT consult, rehab placement. Millicent Krishnan MD 
12:01 AM 
 
 
Social History Tobacco Use  Smoking status: Never Smoker  Smokeless tobacco: Never Used Substance Use Topics  Alcohol use: Never Frequency: Never  Drug use: Never Patient Vitals for the past 4 hrs: 
 Temp Pulse Resp BP SpO2  
08/30/20 2123 98.2 °F (36.8 °C) (!) 101 25 (!) 172/91 97 % Consults: 
 
 
Medications Administered during ED course: 
Medications  
sodium chloride 0.9 % bolus infusion 1,000 mL (1,000 mL IntraVENous New Bag 8/30/20 8381) Current Discharge Medication List  
  
  
 
 
Diagnosis and Disposition Disposition:  Admitted Clinical Impression: 1. Bilateral leg weakness 2. Weight loss 3. Fall in home, initial encounter Attestation: 
I personally performed the services described in this documentation on this date 8/30/2020 for patient Jose Raul Disla. Kathy Asher MD 
 
 
 
I was the first provider for this patient on this visit. To the best of my ability I reviewed relevant prior medical records, electrocardiograms, laboratories, and radiologic studies. The patient's presenting problems were discussed, and the patient was in agreement with the care plan formulated and outlined with them. Kathy Asher MD 
 
Please note that this dictation was completed with Dragon voice recognition software. Quite often unanticipated grammatical, syntax, homophones, and other interpretive errors are inadvertently transcribed by the computer software. Please disregard these errors and excuse any errors that have escaped final proofreading.

## 2020-08-31 NOTE — ROUTINE PROCESS
Bedside, Verbal and Written shift change report given to [employer], RN  (oncoming nurse) by Compa Washington RN (offgoing nurse). Report included the following information SBAR, Kardex, MAR and Recent Results.

## 2020-08-31 NOTE — PROGRESS NOTES
Unblanchable area to right buttocks found during dual skin check at time of patient's admission to [facility]. IP Wound Care consult placed.

## 2020-08-31 NOTE — PROGRESS NOTES
Problem: Falls - Risk of 
Goal: *Absence of Falls Description: Document Ba Cuellar Fall Risk and appropriate interventions in the flowsheet. 8/31/2020 1858 by Kirill Jasso RN Outcome: Progressing Towards Goal 
Note: Fall Risk Interventions: 
Mobility Interventions: Bed/chair exit alarm, Communicate number of staff needed for ambulation/transfer Elimination Interventions: Bed/chair exit alarm, Call light in reach, Toileting schedule/hourly rounds History of Falls Interventions: Bed/chair exit alarm, Door open when patient unattended, Investigate reason for fall 8/31/2020 1838 by Kirill Jasso RN Outcome: Progressing Towards Goal 
Note: Fall Risk Interventions: 
Mobility Interventions: Bed/chair exit alarm, Communicate number of staff needed for ambulation/transfer Elimination Interventions: Bed/chair exit alarm, Call light in reach, Toileting schedule/hourly rounds History of Falls Interventions: Bed/chair exit alarm, Door open when patient unattended, Investigate reason for fall Problem: Patient Education: Go to Patient Education Activity Goal: Patient/Family Education 8/31/2020 1858 by Kirill Jasso RN Outcome: Progressing Towards Goal 
8/31/2020 1838 by Kirill Jasso RN Outcome: Progressing Towards Goal 
  
Problem: Patient Education: Go to Patient Education Activity Goal: Patient/Family Education Outcome: Progressing Towards Goal 
  
Problem: TIA/CVA Stroke: 0-24 hours Goal: Off Pathway (Use only if patient is Off Pathway) Outcome: Progressing Towards Goal 
Goal: Activity/Safety Outcome: Progressing Towards Goal 
Goal: Consults, if ordered Outcome: Progressing Towards Goal 
Goal: Diagnostic Test/Procedures Outcome: Progressing Towards Goal 
Goal: Nutrition/Diet Outcome: Progressing Towards Goal 
Goal: Discharge Planning Outcome: Progressing Towards Goal 
Goal: Medications Outcome: Progressing Towards Goal 
Goal: Respiratory Outcome: Progressing Towards Goal 
Goal: Treatments/Interventions/Procedures Outcome: Progressing Towards Goal 
Goal: Minimize risk of bleeding post-thrombolytic infusion Outcome: Progressing Towards Goal 
Goal: Monitor for complications post-thrombolytic infusion Outcome: Progressing Towards Goal 
Goal: Psychosocial 
Outcome: Progressing Towards Goal 
Goal: *Hemodynamically stable Outcome: Progressing Towards Goal 
Goal: *Neurologically stable Description: Absence of additional neurological deficits Outcome: Progressing Towards Goal 
Goal: *Verbalizes anxiety and depression are reduced or absent Outcome: Progressing Towards Goal 
Goal: *Absence of Signs of Aspiration on Current Diet Outcome: Progressing Towards Goal 
Goal: *Absence of deep venous thrombosis signs and symptoms(Stroke Metric) Outcome: Progressing Towards Goal 
Goal: *Ability to perform ADLs and demonstrates progressive mobility and function Outcome: Progressing Towards Goal 
Goal: *Stroke education started(Stroke Metric) Outcome: Progressing Towards Goal 
Goal: *Dysphagia screen performed(Stroke Metric) Outcome: Progressing Towards Goal 
Goal: *Rehab consulted(Stroke Metric) Outcome: Progressing Towards Goal 
  
Problem: TIA/CVA Stroke: Day 2 Until Discharge Goal: Off Pathway (Use only if patient is Off Pathway) Outcome: Progressing Towards Goal 
Goal: Activity/Safety Outcome: Progressing Towards Goal 
Goal: Diagnostic Test/Procedures Outcome: Progressing Towards Goal 
Goal: Nutrition/Diet Outcome: Progressing Towards Goal 
Goal: Discharge Planning Outcome: Progressing Towards Goal 
Goal: Medications Outcome: Progressing Towards Goal 
Goal: Respiratory Outcome: Progressing Towards Goal 
Goal: Treatments/Interventions/Procedures Outcome: Progressing Towards Goal 
Goal: Psychosocial 
Outcome: Progressing Towards Goal 
Goal: *Verbalizes anxiety and depression are reduced or absent Outcome: Progressing Towards Goal 
 Goal: *Absence of aspiration Outcome: Progressing Towards Goal 
Goal: *Absence of deep venous thrombosis signs and symptoms(Stroke Metric) Outcome: Progressing Towards Goal 
Goal: *Optimal pain control at patient's stated goal 
Outcome: Progressing Towards Goal 
Goal: *Tolerating diet Outcome: Progressing Towards Goal 
Goal: *Ability to perform ADLs and demonstrates progressive mobility and function Outcome: Progressing Towards Goal 
Goal: *Stroke education continued(Stroke Metric) Outcome: Progressing Towards Goal 
  
Problem: Ischemic Stroke: Discharge Outcomes Goal: *Verbalizes anxiety and depression are reduced or absent Outcome: Progressing Towards Goal 
Goal: *Verbalize understanding of risk factor modification(Stroke Metric) Outcome: Progressing Towards Goal 
Goal: *Hemodynamically stable Outcome: Progressing Towards Goal 
Goal: *Absence of aspiration pneumonia Outcome: Progressing Towards Goal 
Goal: *Aware of needed dietary changes Outcome: Progressing Towards Goal 
Goal: *Verbalize understanding of prescribed medications including anti-coagulants, anti-lipid, and/or anti-platelets(Stroke Metric) Outcome: Progressing Towards Goal 
Goal: *Tolerating diet Outcome: Progressing Towards Goal 
Goal: *Aware of follow-up diagnostics related to anticoagulants Outcome: Progressing Towards Goal 
Goal: *Ability to perform ADLs and demonstrates progressive mobility and function Outcome: Progressing Towards Goal 
Goal: *Absence of DVT(Stroke Metric) Outcome: Progressing Towards Goal 
Goal: *Absence of aspiration Outcome: Progressing Towards Goal 
Goal: *Optimal pain control at patient's stated goal 
Outcome: Progressing Towards Goal 
Goal: *Home safety concerns addressed Outcome: Progressing Towards Goal 
Goal: *Describes available resources and support systems Outcome: Progressing Towards Goal 
Goal: *Verbalizes understanding of activation of EMS(911) for stroke symptoms(Stroke Metric) Outcome: Progressing Towards Goal 
Goal: *Understands and describes signs and symptoms to report to providers(Stroke Metric) Outcome: Progressing Towards Goal 
Goal: *Neurolgocially stable (absence of additional neurological deficits) Outcome: Progressing Towards Goal 
Goal: *Verbalizes importance of follow-up with primary care physician(Stroke Metric) Outcome: Progressing Towards Goal 
Goal: *Smoking cessation discussed,if applicable(Stroke Metric) Outcome: Progressing Towards Goal 
Goal: *Depression screening completed(Stroke Metric) Outcome: Progressing Towards Goal

## 2020-09-01 NOTE — PROGRESS NOTES
[address] [facility] [place] 
at [facility], [facility], [address] 
[phone] Consult received. Will see patient shortly. Signed By: Natalie Cruz NP September 1, 2020

## 2020-09-01 NOTE — PROGRESS NOTES
Problem: Mobility Impaired (Adult and Pediatric) Goal: *Acute Goals and Plan of Care (Insert Text) Description: FUNCTIONAL STATUS PRIOR TO ADMISSION: Pt lives with  on first floor of home with 3 step entry. Prior to recent decline, pt had been able to amb w/o device on her own. Her  reports a rapid decline with multiple (many) falls. HOME SUPPORT PRIOR TO ADMISSION: The patient lived with  but did not require assist. 
 
Physical Therapy Goals Initiated 9/1/2020 1. Patient will move from supine to sit and sit to supine , scoot up and down, and roll side to side in bed with supervision/set-up within 7 day(s). 2.  Patient will transfer from bed to chair and chair to bed with minimal assistance/contact guard assist using the least restrictive device within 7 day(s). 3.  Patient will perform sit to stand with minimal assistance/contact guard assist within 7 day(s). 4.  Patient will ambulate with minimal assistance/contact guard assist for 50 feet with the least restrictive device within 7 day(s). 5.  Patient will ascend/descend 3 stairs with handrail(s) with minimal assistance/contact guard assist within 7 day(s). Outcome: Not Met PHYSICAL THERAPY EVALUATION Patient: Mathew Hunt (89 y.o. female) Date: 9/1/2020 Primary Diagnosis: Weakness [R53.1] Tremor [R25.1] Confusion [R41.0] Hypokalemia [E87.6] Precautions: fall ASSESSMENT Based on the objective data described below, the patient presents with impaired motor planning and sequencing, impaired orientation to midline pushing or leaning to R, perceived sensory loss RLE with new lung CA and mets to bone, liver, and brain all contributing to significant decline in functional mobility and gait. Current Level of Function Impacting Discharge (mobility/balance): Pt is received in bed and noted to be shifted with R upper body toward R rail. She comes to sitting with min A given guiding to initiate and vcs/demo to reach for rail to assist. She sits with need for constant support with significant R lean and intermittent pushing to R. Pt appears to be aware of shift to R but does not initiate correction until cued and guided. Pt comes to stand with min A of 2 and initially with much less lean/push than in sitting, using RW. She amb ~6' forward with short shuffling steps and with increasing R lean. Near total breakdown of sequencing and processing with attempt to turn around necessitating sitting on sofa in room. After sitting, bed moved closer and pt again able to stand with min A of 2. She required min to mod A of 2 to turn to sit onto bed but did better responding to cues (verbal and visual) for foot placement and control/sequencing of activity. Pt able to assist well to return to supine. Functional Outcome Measure: The patient scored 15/100 on the barthel outcome measure which is indicative of 85% impairment. Other factors to consider for discharge: A of 2 currently with unpredictability to ability Patient will benefit from skilled therapy intervention to address the above noted impairments. PLAN : 
Recommendations and Planned Interventions: bed mobility training, transfer training, gait training, therapeutic exercises, neuromuscular re-education, patient and family training/education, and therapeutic activities Frequency/Duration: Patient will be followed by physical therapy:  5 times a week to address goals. Recommendation for discharge: (in order for the patient to meet his/her long term goals) To be determined: depending upon progress, expectations of CA mets dx, and 's ability to assist; right now pt would likely need some type of rehab prior to return home or significantly increased assist for  in the home but still too early to fully assess This discharge recommendation: Has not yet been discussed the attending provider and/or case management IF patient discharges home will need the following DME: to be determined (TBD) SUBJECTIVE:  
Patient stated I'm leaning R. OBJECTIVE DATA SUMMARY:  
HISTORY:   
Past Medical History:  
Diagnosis Date Hypertension History reviewed. No pertinent surgical history. Personal factors and/or comorbidities impacting plan of care: multiple areas of metastasis, bone/brain/lung/liver Home Situation Home Environment: (P) Private residence # Steps to Enter: (P) 3 Rails to Enter: (P) Yes Hand Rails : (P) Left One/Two Story Residence: (P) Two story, live on 1st floor Living Alone: (P) No 
Support Systems: (P) Spouse/Significant Other/Partner Patient Expects to be Discharged to[de-identified] Private residence Current DME Used/Available at Home: (P) None EXAMINATION/PRESENTATION/DECISION MAKING:  
Critical Behavior: 
Neurologic State: (P) Alert, Confused Orientation Level: (P) Oriented to person Cognition: Follows commands Hearing: Auditory Auditory Impairment: None Range Of Motion: 
AROM: Generally decreased, functional 
  
  
  
PROM: Within functional limits Strength:   
Strength: Generally decreased, functional 
  
  
  
  
  
  
Tone & Sensation:  
  
  
  
  
  
Sensation: Impaired(c/o R leg numbness) Coordination: 
Coordination: Generally decreased, functional 
 
  
Functional Mobility: 
Bed Mobility: 
Rolling: Minimum assistance(with rail) Supine to Sit: Minimum assistance(verbal cues to sequence) Sit to Supine: Minimum assistance(verbal cues for sequencing) Scooting: Minimum assistance; Moderate assistance Transfers: 
Sit to Stand: Minimum assistance;Assist x2; Other (comment)(R lean/push less upon standing) Stand to Sit: Minimum assistance; Moderate assistance;Assist x2 Bed to Chair: Moderate assistance;Assist x2 Balance:  
Sitting: Impaired Sitting - Static: Poor (constant support); Other (comment)(R lean or pushing toward R; vcs to correct - not maintained) Sitting - Dynamic: Poor (constant support) Standing: Impaired Standing - Static: Other (comment);Fair;Poor(initally fair w/use of RW; decr to poor with approach to sea) Standing - Dynamic : Fair;Poor Ambulation/Gait Training: 
Distance (ft): 6 Feet (ft) Assistive Device: Gait belt;Walker, rolling Ambulation - Level of Assistance: Minimal assistance; Moderate assistance;Assist x2 Gait Abnormalities: Decreased step clearance;Shuffling gait;Trunk sway increased; Other(increasing R lean with turn to seat) Base of Support: Other (comment)(variable) Speed/Evelia: Slow;Pace decreased (<100 feet/min) Step Length: Right shortened;Left shortened(responds to cues to lengthen step L>R) Functional Measure: 
Barthel Index: 
 
Bathin Bladder: 0 Bowels: 5 Groomin Dressin Feedin Mobility: 0 Stairs: 0 Toilet Use: 5 Transfer (Bed to Chair and Back): 5 Total: 15/100 The Barthel ADL Index: Guidelines 1. The index should be used as a record of what a patient does, not as a record of what a patient could do. 2. The main aim is to establish degree of independence from any help, physical or verbal, however minor and for whatever reason. 3. The need for supervision renders the patient not independent. 4. A patient's performance should be established using the best available evidence. Asking the patient, friends/relatives and nurses are the usual sources, but direct observation and common sense are also important. However direct testing is not needed. 5. Usually the patient's performance over the preceding 24-48 hours is important, but occasionally longer periods will be relevant. 6. Middle categories imply that the patient supplies over 50 per cent of the effort. 7. Use of aids to be independent is allowed. Debra Driver., Barthel, DJessWJess ([id number]). Functional evaluation: the Barthel Index. [address] (14)2. SAUL Minor, Esperanza Braun, Jake Slater, [address] (1999). Measuring the change indisability after inpatient rehabilitation; comparison of the responsiveness of the Barthel Index and Functional [place] Measure. Journal of Neurology, Neurosurgery, and Psychiatry, 66(4), 205-950. MARY JO Malik, LUZ Khalil, & Pedro Waldrop M.A. (2004.) Assessment of post-stroke quality of life in cost-effectiveness studies: The usefulness of the Barthel Index and the EuroQoL-5D. [facility], 13, 012-86 Physical Therapy Evaluation Charge Determination History Examination Presentation Decision-Making HIGH Complexity :3+ comorbidities / personal factors will impact the outcome/ POC  MEDIUM Complexity : 3 Standardized tests and measures addressing body structure, function, activity limitation and / or participation in recreation  MEDIUM Complexity : Evolving with changing characteristics  MEDIUM Complexity : FOTO score of 26-74 Based on the above components, the patient evaluation is determined to be of the following complexity level: MEDIUM Pain Rating: 
No c/o during session Activity Tolerance:  
Fair Please refer to the flowsheet for vital signs taken during this treatment. After treatment patient left in no apparent distress:  
Supine in bed, Call bell within reach, Bed / chair alarm activated, Caregiver / family present, and Side rails x 3 
 
COMMUNICATION/EDUCATION:  
The patients plan of care was discussed with: Registered nurse. Fall prevention education was provided and the patient/caregiver indicated understanding., Patient/family have participated as able in goal setting and plan of care. , and Patient/family agree to work toward stated goals and plan of care.  
 
Thank you for this referral. 
Susan Coffman, PT 
 Time Calculation: 33 mins

## 2020-09-01 NOTE — ANESTHESIA PREPROCEDURE EVALUATION
Relevant Problems No relevant active problems Anesthetic History No history of anesthetic complications Review of Systems / Medical History Patient summary reviewed and pertinent labs reviewed Pulmonary Within defined limits Neuro/Psych  
 
seizures: well controlled Comments: Paraplegic gait Tremor Carcinoma of cerebral meninges AMS Convulsive syncope Cardiovascular Within defined limits Hypertension: well controlled Exercise tolerance: >4 METS 
  
GI/Hepatic/Renal 
Within defined limits Endo/Other Cancer Other Findings Comments: Metastatic lung Ca Physical Exam 
 
Airway Mallampati: III 
TM Distance: 4 - 6 cm Neck ROM: normal range of motion Mouth opening: Normal 
 
 Cardiovascular Rhythm: regular Rate: normal 
 
 
 
 Dental 
 
Dentition: Upper dentition intact and Lower dentition intact Pulmonary Breath sounds clear to auscultation Abdominal 
GI exam deferred Other Findings Anesthetic Plan ASA: 3 Anesthesia type: general 
 
Monitoring Plan: BIS Induction: Intravenous Anesthetic plan and risks discussed with: Patient

## 2020-09-01 NOTE — PROGRESS NOTES
Comprehensive Nutrition Assessment Type and Reason for Visit: Initial, Positive nutrition screen Nutrition Recommendations/Plan:  
Continue current diet Add ensure enlive daily Obtain scale weight Nutrition Assessment:    
Patient medically noted for weakness and confusion; new diagnosis of metastatic lung cancer. PMH HTN. Reports of decreased appetite/intake PTA. Patient reports an improving appetite at this time. Has been drinking boost at home; agreeable to trial of ensure enlive for now. Reports UBW of 260#; current weight is patient stated. Encouraged intake of meals as tolerated. Estimated Daily Nutrient Needs: 
Energy (kcal):  2164 kcal (BMR 1664 x 1. 3AF) Protein (g):  84-105g (0.8-1.0 g/kg bw) Fluid (ml/day):  2150 mL Nutrition Related Findings:      
Na 132, K+ 3.3 +Bowel sounds Medications: Decadron, famotidine Wounds:   
None Current Nutrition Therapies: DIET CARDIAC Regular Anthropometric Measures: 
· Height:  5' 9\" (175.3 cm) · Current Body Wt:  104.3 kg (229 lb 15 oz) · BMI Category:  Obese class 1 (BMI 30.0-34. 9) Nutrition Diagnosis:  
· Inadequate protein-energy intake related to (decreased appetite, new dx cancer) as evidenced by (reported decreased intake and weight loss PTA) Nutrition Interventions:  
Food and/or Nutrient Delivery: Start oral nutrition supplement, Continue current diet Nutrition Education and Counseling: No recommendations at this time Coordination of Nutrition Care: No recommendation at this time Goals: 
PO intake >50% of meals/supplement next 3-5 days Nutrition Monitoring and Evaluation:  
Behavioral-Environmental Outcomes: Knowledge or skill Food/Nutrient Intake Outcomes: Food and nutrient intake, Supplement intake Physical Signs/Symptoms Outcomes: Biochemical data, Weight Discharge Planning:   
Continue oral nutrition supplement, Continue current diet Electronically signed by Leander Linares RD on 9/1/2020 at 9:18 AM 
 
Contact: ext [phone]

## 2020-09-01 NOTE — PROGRESS NOTES
Hospitalist Progress Note NAME: Nikhil Hernández :  1955 MRN:  [id number] Assessment / Plan: 
 
71-year-old female complaining of a few months of generalized weakness, frequent falls, extremity weakness especially in the legs, weight loss, confusion and forgetfulness. On exam Mild tremor.  4/5 strength bilateral lower extremities. Of note she just had a recent head CT  Head on   which was normal, just showed microvascular changes.  Her H&P is not at all suggestive of acute CVA, no indication for CT angiography at this time. Labs reassuring Except k 3.3 . Per patient she also lost almost 30 pounds of weight in last couple of month because she did not feel like eating. Patient has history of gastric sleeve for weight loss 4 years ago. Patient denied to be depressed. 
  
  
Generalized weaknesss more in Bilateral leg weakness( intermittent) poa Weight loss poa Mild tremors Concern for metastatic lung ca Nonfocal exam. Progressive symptoms for months. CT scan a couple weeks ago nonspecific. MRI c spine No acute abnormality. 2. Mild spinal stenosis at C4-5. Neural foraminal narrowing as above. RI cervical spine : 
 
 MRI  Lumbar spine showed : 
1. Several scattered osseous metastases. 2. Multilevel spondylosis as above MRI brain : 
1. Extensive leptomeningeal enhancement as well as several subtle areas of 
parenchymal enhancement, highly suspicious for metastatic disease given findings 
on chest CT earlier today suspicious for bronchogenic carcinoma. 2. Extensive chronic white matter disease and areas of remote infarction, with 
no acute intracranial hemorrhage or infarction. - 
- tsh wnl hiv b12 folate wnl   rpr non reactive  ua utox negative    cpk wnl   a1c 5.3 FLP 
-We will check zinc and selenium as patient has history of gastric sleeve operation to rule out deficiency 
-neuro consulted 
-pt/ot consulted Ct scan chest : 
IMPRESSION: Advanced lung carcinoma: 1. Large left upper lobe lung carcinoma. 2. Numerous jany metastases, including contralateral mediastinal and 
ipsilateral supraclavicular. 3. Diffuse, bilateral, pulmonary metastases. 4. Scattered osseous metastases. 5. Few hepatic metastases. CT chest : 
 
 consult [facility] 
  
Hypokalemia- repleted  Monitor   Mag wnl . 
  
Uncontrolled HTN-172/91  Resume home meds  And give iv hydralazine prn 
  
  
Code Status: full Surrogate Decision Maker: Myrna Naranjo  Spouse  [phone]   
  
  
  
DVT Prophylaxis: sq lovenox GI Prophylaxis: not indicated 
  
Baseline: independent 30.0 - 39.9 Obese / Body mass index is 33.97 kg/m². Subjective: Chief Complaint / Reason for Physician Visit FU leg weakness . pt updated on her MRI results and informed her that her symptoms are most likely related to a metastatic lung ca. Not talking and Flat affect. Denies any acute complaints . Discussed with RN events overnight. Review of Systems: 
Symptom Y/N Comments  Symptom Y/N Comments Fever/Chills n   Chest Pain n   
Poor Appetite    Edema Cough n   Abdominal Pain n   
Sputum n   Joint Pain SOB/MEZA    Pruritis/Rash Nausea/vomit n   Tolerating PT/OT Diarrhea    Tolerating Diet y Constipation    Other Could NOT obtain due to:   
 
Objective: VITALS:  
Last 24hrs VS reviewed since prior progress note. Most recent are: 
Patient Vitals for the past 24 hrs: 
 Temp Pulse Resp BP SpO2  
09/01/20 1145 98.4 °F (36.9 °C) 84 18 160/84 99 % 09/01/20 0659 98 °F (36.7 °C) 83 18 160/81 97 % 09/01/20 0355  89     
09/01/20 0256 98 °F (36.7 °C) 89 18 151/73 98 % 08/31/20 2346 98.2 °F (36.8 °C) 90 20 164/88 99 % 08/31/20 2000  75     
08/31/20 1857 98.1 °F (36.7 °C) 77 16 149/84 96 % 08/31/20 1537 98.1 °F (36.7 °C) 86 20 173/81 98 % Intake/Output Summary (Last 24 hours) at 9/1/2020 1449 Last data filed at 8/31/2020 1857 Gross per 24 hour Intake  Output 400 ml Net -400 ml PHYSICAL EXAM: 
General: WD, WN. Alert, cooperative, no acute distress   
EENT:  EOMI. Anicteric sclerae. MMM Resp:  CTA bilaterally, no wheezing or rales. No accessory muscle use CV:  Regular  rhythm,  No edema GI:  Soft, Non distended, Non tender.  +Bowel sounds Neurologic:  Alert and oriented X 3, normal speech, Psych:   Poor insight Skin:  No rashes. No jaundice Reviewed most current lab test results and cultures  YES Reviewed most current radiology test results   YES Review and summation of old records today    NO Reviewed patient's current orders and MAR    YES 
PMH/SH reviewed - no change compared to H&P 
________________________________________________________________________ Care Plan discussed with: 
  Comments Patient x Family RN x Care Manager Consultant Multidiciplinary team rounds were held today with , nursing, pharmacist and clinical coordinator. Patient's plan of care was discussed; medications were reviewed and discharge planning was addressed. ________________________________________________________________________ Total NON critical care TIME: 35   Minutes Total CRITICAL CARE TIME Spent:   Minutes non procedure based Comments >50% of visit spent in counseling and coordination of care x   
________________________________________________________________________ Aston Johnson MD  
 
Procedures: see electronic medical records for all procedures/Xrays and details which were not copied into this note but were reviewed prior to creation of Plan. LABS: 
I reviewed today's most current labs and imaging studies. Pertinent labs include: 
Recent Labs  
  09/01/20 0254 08/31/20 
0232 08/30/20 
2227 WBC 10.6 10.4 9.3 HGB 13.5 12.8 13.7 HCT 42.0 39.9 42.8  292 235 Recent Labs  
  09/01/20 0254 08/31/20 0232 08/30/20 2227 * 135* 134* K 3.3* 3.2* 3.3*  
CL 98 100 98 CO2 29 30 33* * 105* 126* BUN 19 19 21* CREA 0.51* 0.53* 0.59 CA 9.1 8.3* 9.1 MG  --  2.1  --   
ALB 2.9* 2.8* 3.0* TBILI 0.9 0.9 1.0 ALT 58 60 65 INR  --  1.2*  --   
 
 
Signed: Efraín Ham MD

## 2020-09-01 NOTE — CONSULTS
[address] [facility] 
at [facility], [facility], [address] 
[phone] Hematology/ Oncology Consult Note Reason for consult:  
 
Masoud Irby is a 60 y.o. female who we have been asked to see by Dr. Esperanza Wallace for metastatic cancer. Subjective:  
 
Masoud Irby is a 60year old female who presented to the ED on 8/30 with complaint of generalized weakness with frequent falls. She also says she has intermittent weakness of her lower extremities, greater on the right. MRI Brain showed extensive leptomeningeal enhancement. CT chest showed a large left upper lobe lung mass, contralateral mediastinal and ipsilateral supraclavicular node, hepatic metastases and scattered osseous metastases. She has had a 30 lb unintentional weight loss and mental confusion. She denies headache. 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 Her last mammogram was in 2015. Review of Systems: A comprehensive review of systems was negative except for that written in the History of Present Illness. Past Medical History:  
Diagnosis Date  Hypertension History reviewed. No pertinent surgical history. Family History Problem Relation Age of Onset  Hypertension Mother  Diabetes Father  No Known Problems Sister  No Known Problems Brother  No Known Problems Other Social History Tobacco Use  Smoking status: Never Smoker  Smokeless tobacco: Never Used Substance Use Topics  Alcohol use: Never Frequency: Never Current Facility-Administered Medications Medication Dose Route Frequency Provider Last Rate Last Dose  sodium chloride (NS) flush 5-40 mL  5-40 mL IntraVENous Q8H Issac Alonzo MD   10 mL at 09/01/20 1213  
 sodium chloride (NS) flush 5-40 mL  5-40 mL IntraVENous PRN Arian Wang MD      
  acetaminophen (TYLENOL) tablet 650 mg  650 mg Oral Q6H PRN Clinton, Deedee Lin MD      
 Or  
 acetaminophen (TYLENOL) suppository 650 mg  650 mg Rectal Q6H PRN Clinton, Deedee Lin MD      
 polyethylene glycol (MIRALAX) packet 17 g  17 g Oral DAILY PRN Clinton, Deedee Lin MD      
 promethazine (PHENERGAN) tablet 12.5 mg  12.5 mg Oral Q6H PRN Clinton, Deedee Lin MD      
 Or  
 ondansetron (ZOFRAN) injection 4 mg  4 mg IntraVENous Q6H PRN Clinton, Deedee Lin MD      
 enoxaparin (LOVENOX) injection 40 mg  40 mg SubCUTAneous DAILY Caitlin Meyers MD   40 mg at 09/01/20 6716  famotidine (PEPCID) tablet 20 mg  20 mg Oral BID Caitlin Meyers MD   20 mg at 09/01/20 0894  losartan (COZAAR) tablet 100 mg  100 mg Oral DAILY Sathya Dodd MD   100 mg at 09/01/20 0377  dexamethasone (DECADRON) 4 mg/mL injection 4 mg  4 mg IntraVENous Q6H Karime Card MD   4 mg at 09/01/20 1212 Allergies Allergen Reactions  Pcn [Penicillins] Hives Objective:  
 
Patient Vitals for the past 8 hrs: 
 BP Temp Pulse Resp SpO2 Height 09/01/20 1145 160/84 98.4 °F (36.9 °C) 84 18 99 %   
09/01/20 0914      5' 9\" (1.753 m) 09/01/20 0659 160/81 98 °F (36.7 °C) 83 18 97 %  Lab Results Component Value Date/Time WBC 10.6 09/01/2020 02:54 AM  
 HGB 13.5 09/01/2020 02:54 AM  
 HCT 42.0 09/01/2020 02:54 AM  
 PLATELET 162 87/19/9448 02:54 AM  
 MCV 81.6 09/01/2020 02:54 AM  
 
 
Physical Exam:  
General appearance: alert, cooperative, no distress, appears stated age Head: Normocephalic, without obvious abnormality, atraumatic Neck: supple, symmetrical, trachea midline and no adenopathy Lungs: clear to auscultation bilaterally Heart: regular rate and rhythm Abdomen: soft, non-tender. Bowel sounds normal. No masses,  no organomegaly Extremities: bilateral lower extremity weakness R>L Skin: ecchymoses - scattered Lymph nodes: Supraclavicular adenopathy: right. Neurologic: A and O x 4 MRI Results (most recent): 
 
 Brain: 8/31/2020 Impression: 1. Extensive leptomeningeal enhancement as well as several subtle areas of 
parenchymal enhancement, highly suspicious for metastatic disease given findings 
on chest CT earlier today suspicious for bronchogenic carcinoma. 2. Extensive chronic white matter disease and areas of remote infarction, with 
no acute intracranial hemorrhage or infarction. 
  
CT Results (most recent): 
Results from Hospital Encounter encounter on 08/30/20 CT CHEST W CONT Narrative CT CHEST WITH CONTRAST. 8/31/2020 4:16 AM  
 
INDICATION: Left upper lobe mass suspected on chest radiograph. COMPARISON: Same day chest radiograph. TECHNIQUE: CT of the chest was performed after the administration 100 cc IV Isovue-370. Coronal and sagittal reconstructions were performed. CT dose 
reduction was achieved through use of a standardized protocol tailored for this 
examination and automatic exposure control for dose modulation. FINDINGS: 
A large left upper lobe lung carcinoma measures 66 x 67 x 88 mm. It replaces the 
left upper lobe, with the exception of the inferior lingular segment. Numerous 
jany metastases include a left supraclavicular metastasis measuring 13 mm on 
image 8-301, a contralateral right superior mediastinal metastasis measuring 15 
mm (301-15), and a right precarinal/peribronchial metastasis measuring 22 mm 
(301-22). There are innumerable intrapulmonary metastases throughout the 
bilateral lungs. No overt pleural nodularity associated with a trace left 
pleural effusion. Mixed sclerotic and lucent osseous lesions in C7, T3, T6, T7, T8, T10, and T12 
are consistent with metastases. Small hypodense hepatic masses in segment 7 
likely also metastases. The central airways are patent, with the exception of the obliterated left upper 
lobe bronchus. No pneumothorax.  The heart size is normal. Pulmonary arterial 
 enlargement is likely secondary to lung carcinoma, as the left pulmonary artery 
is narrowed. Post sleeve gastrectomy and cholecystectomy. The visualized upper 
abdomen Impression IMPRESSION: Advanced lung carcinoma: 
1. Large left upper lobe lung carcinoma. 2. Numerous jany metastases, including contralateral mediastinal and 
ipsilateral supraclavicular. 3. Diffuse, bilateral, pulmonary metastases. 4. Scattered osseous metastases. 5. Few hepatic metastases. Assessment: 1. Metastatic cancer Favors lung primary 
large upper lobe lung mass, osseous metastases, leptomeningeal enhancement, hepatic metastases + 30 lb unintentional weight loss 
+ history or smoking - quit in 2004 Plan for left supraclavicular node dissection tomorrow for tissue diagnosis and molecular studies. Discuss in detail with patient and  regarding poor prognosis. The patient would like to proceed with obtaining a diagnosis. Will discuss a plan of care once we have a definitive diagnosis Plan: 1. CT Abd/pelvis to complete staging 2. Supraclavicular node dissection by Dr. Tanna Guillen tomorrow for tissue diagnosis and molecular studies 3. Palliative medicine for symptom management as well as goals of care 4. Will discuss options once we have a definitive diagnosis Christine Chapman NP

## 2020-09-01 NOTE — WOUND CARE
Wound Care Consult for unblanchable redness to the right buttocks that was present on admission. The patient has neurological deficits at this time but PT is working with her to get her out of the bed. I was able to see her skin while she was still standing up. Assessment: The gluteal cleft has some redness to the skin but this looks like it is caused by moisture and the skin is scabbing slightly. The skin is currently blanchable all around it. Treatment Recommendation: Apply Max. Strength Desitin cream (zinc oxide cream) to clean, DRY skin to heal and protect from moisture. Plan: Discussed plan with Checo DOYLE.  Will order the Desitin cream.  
Jeronimo Ro RN, BSN, [employer]

## 2020-09-01 NOTE — PROGRESS NOTES
Discussed with Dr. Arturo Fraga. Will plan for excisional biopsy of the supraclavicular lymph node in the OR tomorrow. Please keep her n.p.o. after midnight. Thank you

## 2020-09-01 NOTE — ACP (ADVANCE CARE PLANNING)
Advance Care Planning Note NAME: Arpita Dennis :  1955 MRN:  [id number] Date/Time:  2020 9:33 AM 
 
Active Diagnoses: 
Hospital Problems  Never Reviewed Codes Class Noted POA Hypokalemia ICD-10-CM: E87.6 ICD-9-CM: 276.8  2020 Unknown Confusion ICD-10-CM: R41.0 ICD-9-CM: 298.9  2020 Unknown Tremor ICD-10-CM: R25.1 ICD-9-CM: 781.0  2020 Unknown Weakness ICD-10-CM: R53.1 ICD-9-CM: 780.79  2020 Unknown Metastatic lung cancer (metastasis from lung to other site) [facility]) ICD-10-CM: C34.90 ICD-9-CM: 162.9  2020 Unknown Carcinoma of cerebral meninges ([facility] [address] ICD-10-CM: C70.0 ICD-9-CM: 192.1  2020 Unknown Paraplegic gait ICD-10-CM: R26.1 ICD-9-CM: 781.2  2020 Unknown Acute alteration in mental status ICD-10-CM: R41.82 
ICD-9-CM: 780.97  2020 Unknown Convulsive syncope ICD-10-CM: R55 
ICD-9-CM: 780.2, 780.39  2020 Unknown These active diagnoses are of sufficient risk that focused discussion on advance care planning is indicated in order to allow the patient to thoughtfully consider personal goals of care, and if situations arise that prevent the ability to personally give input, to ensure appropriate representation of their personal desires for different levels and aggressiveness of care. Discussion:  
Code status addressed and wants to be a Full Code. Patient wants central line and vasopressors if needed. Patient would also want a feeding tube, if needed, for nutritional support. Patient  would like to assign her  Deidre Mckeon  [phone]   
 
  as the surrogate decision maker. Persons present and participating in discussion: Arpita Dennis, Elicia Perdomo MD 
 
 
Time Spent:  
Total time spent face-to-face in education and discussion:   16  minutes.   
 
 
 
Elicia Perdomo MD  
Hospitalist

## 2020-09-01 NOTE — CONSULTS
Surgery Consult Consulted by: Dr. Donald Lagunas. Thank you. PCP: None History and data reviewed. Pt interviewed/examined. 1-2cm nodule, medial left supraclavicular region. Impression: 
Likely pathologic left SC LN in the setting of widely metastatic dz. Plan: 
Excisional biopsy in the OR tomorrow afternoon. Risks and benefits discussed with patient and family who wish to proceed. FULL NOTE ADDENDUM WILL BE ADDED BELOW. Thanks. Signed By: Tony Batista MD   
 September 1, 2020   
 
------------------------------------------------------------------------ 
 
 
ADDENDUM:  
 
Patient interviewed and examined. Subjective:  
  
Pedro De La Vega is a 60 y.o. female who is admitted with weakness, poor appetite and frequent falls. Found on imaging to have large left upper lobe lung mass and likely mets to the liver and brain. She is noted to have enlarged  mediastinal and left supraclavicular nodes and I am asked to evaluate for possible biopsies. Objective:  
See flowsheet for vitals. Physical Exam: PHYSICAL EXAM: 
Gen:  [x]     A&O     [x]      No acute distress [x]     non-toxic     []     ill apearing     []     Critical     
 
HEENT:   [x]     anicteric    []      scleral icterus [x]     moist mucosa     []     dry mucosa Palpable nodule deep to SCM insertion on the clavicle. Relatively deep. RESP:   [x]     CTA bilaterally, no wheezing/rhonchi/rales/crackles []     wheezing     []     rhonchi     []     crackles     []     use of accessory muscles CARD:  [x]     regular rate and rhythm     [x]     No murmurs/rubs/gallops 
  []     irregular rhythm     []     Murmur     []     Rubs     []     Gallops ABD:     [x]     soft  [x]     non distended  [x]     non tender  [x]      NABS SKIN:   [x]     normal      []     Rashes      []     Ulcers EXT:  [x]      No CCE     []      2+ pulses throughout []      Clubbing     []     Cyanosis     []     Edema     []     diminished pulses NEUR:   [x]     Strength normal     []weakness  []LUE    []RUE    []LLE    []RLE [x]     follows commands PSYCH:   insight []poor   [x]good    
                 []     depressed     []     anxious     []     agitated Past Medical History:  
Diagnosis Date  Hypertension Past Surgical History:  
Procedure Laterality Date  HX APPENDECTOMY  HX CHOLECYSTECTOMY  HX HYSTERECTOMY  HX TONSIL AND ADENOIDECTOMY Family History Problem Relation Age of Onset  Hypertension Mother  Diabetes Father  No Known Problems Sister  No Known Problems Brother  No Known Problems Other Social History Socioeconomic History  Marital status:  Spouse name: Not on file  Number of children: Not on file  Years of education: Not on file  Highest education level: Not on file Tobacco Use  Smoking status: Never Smoker  Smokeless tobacco: Never Used Substance and Sexual Activity  Alcohol use: Never Frequency: Never  Drug use: Never Prior to Admission medications Medication Sig Start Date End Date Taking? Authorizing Provider  
losartan (COZAAR) 50 mg tablet Take 50 mg by mouth daily. Yes Other, MD Timothy  
 
ALLERGIES:   
Allergies Allergen Reactions  Pcn [Penicillins] Hives Review of Systems:  (unchecked were asked but negative) []      Fever/chills     []      Abd Pain  
[x]      Fatique                                   []      Nausea/Vomit  
[]      Weight loss    []      Diarrhea []      Constipation   
[]      Headache    []      Blood in stool 
[]      Visual loss    []      Hematuria  
[]      Hearing loss    []      Dysuria     
[]      Heat intolerance    []      Myalgias 
[]      Cold intolerance    []      Arthralgias 
[]      Reflux     []      Neuropathy  
[]      Dysphagia    []      Easy bruising []      Chest Pain    []      Prolonged bleeding  
[]      Palpitations    []      Anxiety [x]      Cough                                                    []      Depression                               
[]      Sputum          
[]      SOB/MEZA []     Unable to obtain  ROS due to  []     mental status change  []     sedated   []     intubated LABS: 
Recent Labs  
  09/01/20 0254 08/31/20 0232 WBC 10.6 10.4 HGB 13.5 12.8 HCT 42.0 39.9  292 Recent Labs  
  09/01/20 0254 08/31/20 0232 08/30/20 
2227 * 135* 134* K 3.3* 3.2* 3.3*  
CL 98 100 98 CO2 29 30 33* BUN 19 19 21* CREA 0.51* 0.53* 0.59 * 105* 126* CA 9.1 8.3* 9.1 MG  --  2.1  --   
 
Recent Labs  
  09/01/20 0254 08/31/20 0232 08/30/20 
2227  99 106  
TP 6.6 6.2* 6.9 ALB 2.9* 2.8* 3.0*  
GLOB 3.7 3.4 3.9 Recent Labs 08/31/20 
7446 INR 1.2* PTP 12.0* Signed By: Jennifer Marques MD   
 September 2, 2020

## 2020-09-01 NOTE — PROGRESS NOTES
SPEECH LANGUAGE PATHOLOGY BEDSIDE SWALLOW EVALUATION/ discharge from swallowing Patient: Urmila George (6 y.o. female) Date: 9/1/2020 Primary Diagnosis: Weakness [R53.1] Tremor [R25.1] Confusion [R41.0] Hypokalemia [E87.6] Precautions: fall ASSESSMENT : 
Based on the objective data described below, the patient presents with functional oropharyngeal swallow. She appears confused and is demonstrating abnormal self-feeding behavior. For example, using her straw as a fork, dipping potatoes in butter, then putting them on an english muffin as a sandwich. She may benefit from a cognitive-communication evaluation for informational purposes, though given her diagnosis, question whether treatment would be beneficial.  
She may be at increased risk for swallow decline depending upon progression of her disease and treatment approaches. Patient will benefit from skilled intervention to address the above impairments. Patients rehabilitation potential is considered to be Fair PLAN : 
Recommendations and Planned Interventions: 
Regular diet/thin liquids, No current SLP swallowing needs-consider re-assessment of swallow evaluation if there is a decline in function. She may benefit from occasional check-ins while self-feeding to ensure she is eating/drinking appropriately Consider cognitive-communication assessment SUBJECTIVE:  
Patient stated No, I don't need any help. OBJECTIVE:  
 
Past Medical History:  
Diagnosis Date  Hypertension History reviewed. No pertinent surgical history. Prior Level of Function/Home Situation:   
Home Situation Home Environment: Private residence One/Two Story Residence: One story Living Alone: No 
Support Systems: Family member(s) Patient Expects to be Discharged to[de-identified] Private residence Current DME Used/Available at Home: None Diet prior to admission: regular/thins Current Diet:  Regular/thins Cognitive and Communication Status: Neurologic State: Alert, Confused Orientation Level: Oriented to person Cognition: Follows commands Perseveration: No perseveration noted Oral Assessment: 
Oral Assessment Labial: No impairment Dentition: Natural 
Oral Hygiene: dry Lingual: No impairment Mandible: No impairment P.O. Trials: 
Patient Position: upright in bed Vocal quality prior to P.O.: No impairment Consistency Presented: Thin liquid; Solid How Presented: Self-fed/presented;Cup/gulp; Spoon Bolus Acceptance: No impairment Bolus Formation/Control: No impairment Propulsion: No impairment Oral Residue: None Initiation of Swallow: No impairment Laryngeal Elevation: Functional 
Aspiration Signs/Symptoms: None Pharyngeal Phase Characteristics: No impairment, issues, or problems Oral Phase Severity: No impairment Pharyngeal Phase Severity : No impairment NOMS:  
The NOMS functional outcome measure was used to quantify this patient's level of swallowing impairment. Based on the NOMS, the patient was determined to be at level 7 for swallow function NOMS Swallowing Levels: 
Level 1 (CN): NPO Level 2 (CM): NPO but takes consistency in therapy Level 3 (CL): Takes less than 50% of nutrition p.o. and continues with nonoral feedings; and/or safe with mod cues; and/or max diet restriction Level 4 (CK): Safe swallow but needs mod cues; and/or mod diet restriction; and/or still requires some nonoral feeding/supplements Level 5 (CJ): Safe swallow with min diet restriction; and/or needs min cues Level 6 (CI): Independent with p.o.; rare cues; usually self cues; may need to avoid some foods or needs extra time Level 7 (CH): Independent for all p.o. ROSE MARY (2003). National Outcomes Measurement System (NOMS): Adult Speech-Language Pathology User's Guide. Pain: 
Pain Scale 1: Numeric (0 - 10) Pain Intensity 1: 0 After treatment:  
Patient left in no apparent distress in bed, Call bell within reach and Nursing notified COMMUNICATION/EDUCATION:  
Patient was educated regarding purpose of SLP visit. Her level of understanding was unclear The patient's plan of care including recommendations, planned interventions, and recommended diet changes were discussed with: Registered nurse. Patient is unable to participate in goal setting and plan of care. Thank you for this referral. 
Angie Wise SLP Time Calculation: 15 mins

## 2020-09-01 NOTE — PROGRESS NOTES
Bedside, Verbal and Written shift change report given to Dashawn Lee RN  (oncoming nurse) by [address], RN (offgoing nurse).  Report included the following information SBAR, Kardex, MAR and Recent Results.

## 2020-09-01 NOTE — PROCEDURES
[address] 
EEG Name:  Juli Perez 
MR#:  [id number] :  1955 ACCOUNT #:  [de-identified] DATE OF SERVICE:  2020 CLINICAL INDICATION:  The patient is a 29-year-old female with a history of altered mental status, slight confusion, inability to walk. EEG to rule out seizures, rule out cortical abnormality, rule out encephalopathy. EEG CLASSIFICATION:  On this patient is dysrhythmia grade II, generalized. DESCRIPTION OF THE RECORD:  This is a 16-channel EEG recording on the patient to rule out possible seizures, rule encephalopathy, rule out cortical abnormality. The background rhythm during this recording was somewhat poorly formed occipital alpha rhythm of 8-9 Hz that did attenuate some with eye opening. Hyperventilation was not performed. Photic stimulation produced a minimal driving response in the posterior head regions. During this recording, the patient did enter states of sleep with K-complexes and sleep spindles seen in the central head regions. There were no areas of focal slowing or spike or spike-and-wave discharge is seen. INTERPRETATION:  This is a moderately abnormal electroencephalogram due to wide spread generalized slowing seen most consistent with diffuse encephalopathy of toxic metabolic or degenerative type. No clear focal process or epileptiform activity seen. Clinical correlation recommended. Mandi Mcmillan MD 
 
 
[id number] 
D:  2020 22:57 
T:  2020 7:47 JOB #:  [id number] CC:   Conor Dodge MD

## 2020-09-01 NOTE — ROUTINE PROCESS
Bedside and Verbal shift change report given to Rabia (oncoming nurse) by Marina Jennings (offgoing nurse). Report included the following information SBAR, Kardex, Intake/Output and MAR. Zone Phone:   [phone] Significant changes during shift:  none Patient Information Carley Blanco 60 y.o. 
8/30/2020  9:13 PM by Monisha Morgan MD. Carley Blanco was admitted from Home 
 
Problem List 
 
Patient Active Problem List  
 Diagnosis Date Noted  Hypokalemia 08/31/2020  Confusion 08/31/2020  Tremor 08/31/2020  Weakness 08/31/2020  Metastatic lung cancer (metastasis from lung to other site) [facility]) 08/31/2020  Carcinoma of cerebral meninges ([facility] [address] 08/31/2020  Paraplegic gait 08/31/2020  Acute alteration in mental status 08/31/2020  Convulsive syncope 08/31/2020 Past Medical History:  
Diagnosis Date  Hypertension Core Measures: CVA: Yes Yes Activity Status: OOB to Chair No 
Ambulated this shift Yes Bed Rest Yes DVT prophylaxis: DVT prophylaxis Med- Yes DVT prophylaxis SCD or NORM- No  
 
Wounds: (If Applicable) Wounds- Yes Location sacrum Patient Safety: 
 
Falls Score Total Score: 5 Safety Level_______ Bed Alarm On? Yes Sitter? No 
 
Plan for upcoming shift: safety, NPO at midnight Discharge Plan: No  
 
Active Consults: 
IP CONSULT TO HOSPITALIST 
IP CONSULT TO NEUROLOGY 
IP CONSULT TO HEMATOLOGY 
IP CONSULT TO PULMONOLOGY 
IP CONSULT TO GENERAL SURGERY

## 2020-09-01 NOTE — PROGRESS NOTES
Consult REFERRED BY: 
None CHIEF COMPLAINT: Weakness in the legs Subjective:  
 
Pedro De La Vega is a 60 y.o. right-handed  female seen at the request of Dr. Genie Alejandre for evaluation of new problem of increasing difficulty with leg weakness, right greater than left but not associated with any significant back pain or neck pain, but with some pain radiating into her arms. Is been going on for several months, and she is had a 30 pound weight loss in addition. She had an EMG study of the right lower extremity by orthopedics 2 weeks ago that was unremarkable. She was found to have a large lung cancer on her x-ray today, with metastatic disease throughout the lung and lymph nodes, and may be to the liver, and her MRI scan of the brain shows leptomeningeal and intracranial lesions all consistent with her metastatic lung cancer. Cervical MRI and lumbar MRIs show some degenerative arthritis, but no significant structural brain lesions and no significant leptomeningeal enhancement. Patient denies any headache, or fever or meningismus or difficulty with any of her cranial nerves. Her laboratory studies are relatively unremarkable except for low potassium level. Her bowel and bladder function remained stable. Past Medical History:  
Diagnosis Date  Hypertension History reviewed. No pertinent surgical history. Family History Problem Relation Age of Onset  Hypertension Mother  Diabetes Father  No Known Problems Sister  No Known Problems Brother  No Known Problems Other Social History Tobacco Use  Smoking status: Never Smoker  Smokeless tobacco: Never Used Substance Use Topics  Alcohol use: Never Frequency: Never Current Facility-Administered Medications:  
  zinc oxide-cod liver oil (DESITIN) 40 % paste, , Topical, TID, Donavon Alexandre MD 
  sodium chloride (NS) flush 5-40 mL, 5-40 mL, IntraVENous, Q8H, Din, Rustam Arroyo MD, 10 mL at 09/01/20 1213 
  sodium chloride (NS) flush 5-40 mL, 5-40 mL, IntraVENous, PRN, Din, Rustam Arroyo MD 
  acetaminophen (TYLENOL) tablet 650 mg, 650 mg, Oral, Q6H PRN **OR** acetaminophen (TYLENOL) suppository 650 mg, 650 mg, Rectal, Q6H PRN, Din, Rustam Arroyo MD 
  polyethylene glycol (MIRALAX) packet 17 g, 17 g, Oral, DAILY PRN, Din, Rustam Arroyo MD 
  promethazine (PHENERGAN) tablet 12.5 mg, 12.5 mg, Oral, Q6H PRN **OR** ondansetron (ZOFRAN) injection 4 mg, 4 mg, IntraVENous, Q6H PRN, Din, Issac CAZARES MD 
  enoxaparin (LOVENOX) injection 40 mg, 40 mg, SubCUTAneous, DAILY, Din, Rustam Arroyo MD, 40 mg at 09/01/20 1203   famotidine (PEPCID) tablet 20 mg, 20 mg, Oral, BID, Din, Rustam Arroyo MD, 20 mg at 09/01/20 1738   losartan (COZAAR) tablet 100 mg, 100 mg, Oral, DAILY, Brianne Kaye MD, 100 mg at 09/01/20 1840   dexamethasone (DECADRON) 4 mg/mL injection 4 mg, 4 mg, IntraVENous, Q6H, Deepthi Milian MD, 4 mg at 09/01/20 1738 Allergies Allergen Reactions  Pcn [Penicillins] Hives MRI Results (most recent): 
Results from Hospital Encounter encounter on 08/30/20 MRI LUMB SPINE W WO CONT Narrative EXAM: MRI LUMB SPINE W WO CONT INDICATION: generalized weakness more in extremities for few months  but 
worsening. Lung cancer COMPARISON: None TECHNIQUE: MR imaging of the lumbar spine was performed using the following 
sequences: sagittal T1, T2, STIR;  axial T1, T2 prior to and following contrast 
administration. CONTRAST: 20 mL of Dotarem. FINDINGS: 
 
There is normal alignment of the lumbar spine. Vertebral body heights are 
maintained. Chronic degenerative fatty change is seen at the L4-5 endplates. No 
fracture. There is a 1.7 cm lesion in the right side of L3 with mildly decreased T1 and T2 signal. There is a subtle ring of enhancement. There are several 
scattered similar lesions in the sacrum. There is a 9 mm lesion in the posterior right iliac bone. There are several scattered incidental intraosseous lipomas 
both increased T1 and T2 signal. The most prominent one is seen in T11. The conus medullaris terminates at T12. Signal and caliber of the distal spinal 
cord are within normal limits. There is no pathologic intrathecal enhancement. The paraspinal soft tissues are within normal limits. Lower thoracic spine: No herniation or stenosis. L1-L2: No herniation or stenosis. L2-L3: Mild disc space narrowing. Mild broad-based disc bulge that is slightly 
asymmetric towards the left. There is mild spinal stenosis with greater 
impingement left lateral recess. There is mild left neural foraminal narrowing. L3-L4: Mild disc space narrowing. Mild broad-based disc bulge causing mild 
spinal stenosis. No significant neural foraminal narrowing. L4-L5: Severe disc space narrowing. Minimal broad-based disc osteophyte complex 
causing minimal spinal stenosis. There is mild left and moderate right neural 
foraminal narrowing. L5-S1: No herniation or stenosis. Mild bilateral posterior facet arthropathy. Impression IMPRESSION: 
 
1. Several scattered osseous metastases. 2. Multilevel spondylosis as above. Results from [facility] Encounter encounter on 08/30/20 MRI LUMB SPINE W WO CONT Narrative EXAM: MRI LUMB SPINE W WO CONT INDICATION: generalized weakness more in extremities for few months  but 
worsening. Lung cancer COMPARISON: None TECHNIQUE: MR imaging of the lumbar spine was performed using the following 
sequences: sagittal T1, T2, STIR;  axial T1, T2 prior to and following contrast 
administration. CONTRAST: 20 mL of Dotarem. FINDINGS: 
 
There is normal alignment of the lumbar spine. Vertebral body heights are 
maintained. Chronic degenerative fatty change is seen at the L4-5 endplates. No 
fracture. There is a 1.7 cm lesion in the right side of L3 with mildly decreased T1 and T2 signal. There is a subtle ring of enhancement. There are several 
scattered similar lesions in the sacrum. There is a 9 mm lesion in the posterior 
right iliac bone. There are several scattered incidental intraosseous lipomas 
both increased T1 and T2 signal. The most prominent one is seen in T11. The conus medullaris terminates at T12. Signal and caliber of the distal spinal 
cord are within normal limits. There is no pathologic intrathecal enhancement. The paraspinal soft tissues are within normal limits. Lower thoracic spine: No herniation or stenosis. L1-L2: No herniation or stenosis. L2-L3: Mild disc space narrowing. Mild broad-based disc bulge that is slightly 
asymmetric towards the left. There is mild spinal stenosis with greater 
impingement left lateral recess. There is mild left neural foraminal narrowing. L3-L4: Mild disc space narrowing. Mild broad-based disc bulge causing mild 
spinal stenosis. No significant neural foraminal narrowing. L4-L5: Severe disc space narrowing. Minimal broad-based disc osteophyte complex 
causing minimal spinal stenosis. There is mild left and moderate right neural 
foraminal narrowing. L5-S1: No herniation or stenosis. Mild bilateral posterior facet arthropathy. Impression IMPRESSION: 
 
1. Several scattered osseous metastases. 2. Multilevel spondylosis as above. Review of Systems: A comprehensive review of systems was negative except for: Constitutional: positive for fatigue, malaise and weight loss Musculoskeletal: positive for myalgias, arthralgias, stiff joints and muscle weakness Neurological: positive for coordination problems, gait problems and weakness Vitals:  
 09/01/20 0659 09/01/20 0914 09/01/20 1145 09/01/20 1537 BP: 160/81  160/84 170/90 Pulse: 83  84 95 Resp: 18  18 18 Temp: 98 °F (36.7 °C)  98.4 °F (36.9 °C) 97.9 °F (36.6 °C) SpO2: 97%  99% 97% Weight:      
Height:  5' 9\" (1.753 m) Objective: I 
 
 
NEUROLOGICAL EXAM: 
 
Appearance: The patient is well developed, well nourished, provides a poor history and is in no acute distress. Mental Status: Oriented to time, place and person, and the president, cognitive function is a bit slow and mildly abnormal and speech is fluent and no aphasia or dysarthria. Mood and affect appropriate but very anxious and mildly encephalopathic. Cranial Nerves:   Intact visual fields. Fundi are benign, disc are flat, no lesions seen on funduscopy. YESENIA, EOM's full, no nystagmus, no ptosis. Facial sensation is normal. Corneal reflexes are not tested. Facial movement is symmetric. Hearing is normal bilaterally. Palate is midline with normal sternocleidomastoid and trapezius muscles are normal. Tongue is midline. Neck without meningismus or bruits Temporal arteries are not tender or enlarged TMJ areas are not tender on palpation Motor:  5/5 strength in upper proximal and distal muscles, but strength in the right leg is about 3/5 and in the left leg about 4/5. Normal bulk and tone. No fasciculations. Rapid alternating movement is intact on the left and a bit slow on the right Reflexes:   Deep tendon reflexes 2+/4 on the left and and 3+/4 on the right. No babinski or clonus present Sensory:   Normal to touch, pinprick and vibration and temperature. DSS is intact Gait:  Not tested. Tremor:   No tremor noted. Cerebellar:  Not tested cerebellar signs on Romberg and tandem testing and finger-nose-finger exam.  
Neurovascular:  Normal heart sounds and regular rhythm, peripheral pulses intact, and no carotid bruits. Assessment: ICD-10-CM ICD-9-CM 1. Bilateral leg weakness  R29.898 729.89   
2. Weight loss  R63.4 783.21   
3. Fall in home, initial encounter  W19. Mansi Ball K858.3 Y92.009 E849.0 4. Acute alteration in mental status  R41.82 780.97   
5. Carcinoma of cerebral meninges (HCC)  C70.0 192.1 6. Confusion  R41.0 298.9 7. Convulsive syncope  R55 780.2   
  780.39   
8. Lung cancer metastatic to brain ([facility] [place] [address]  C34.90 162.9   
 C79.31 198.3 9. Paraplegic gait  R26.1 781.2 10. Bilateral carotid artery stenosis  I65.23 433.10   
  433.30 11. Cerebral microvascular disease  I67.9 437.9 Active Problems: Hypokalemia (8/31/2020) Confusion (8/31/2020) Tremor (8/31/2020) Weakness (8/31/2020) Metastatic lung cancer (metastasis from lung to other site) [facility]) (8/31/2020) Carcinoma of cerebral meninges ([facility] [place] [address] (8/31/2020) Paraplegic gait (8/31/2020) Acute alteration in mental status (8/31/2020) Convulsive syncope (8/31/2020) Plan:  
 
Patient has leptomeningeal and intracranial metastatic disease, most likely causing her symptoms, and the right leg is probably more involved than the left because of a left pontine lesion. We will continue steroids, and she will need a biopsy and radiation therapy consults once we have a tissue diagnosis We will check an MRI of the thoracic spine just to make sure there is no cord lesion there. Tragic case Signed By: Sebastian Siddiqi MD   
 September 1, 2020 CC: None FAX: None 
9 AM

## 2020-09-01 NOTE — PROGRESS NOTES
Reason for Admission:   Generalized weakness and frequent falls. RUR Score:        15 % Plan for utilizing home health:      Patient would benefit from pt/ot for addition PCP: First and Last name:   
 Name of Practice:  
 Are you a current patient: Yes/No:  
 Approximate date of last visit:  
 Can you participate in a virtual visit with your PCP:  
                 
Current Advanced Directive/Advance Care Plan: on file. Patient  Transition of Care Plan:         
 CM opened case for assessment of D/C planning needs, CM reviewed chart. multiple falls while feeling progressively weak. Pt having trouble getting her thoughts into actions difficulty communicating. Possible new diagnose of lung cancer. Consult to neurology, Hematology, pulmonology, wound care. CM will ask for pt/ot consult. 1124 CM spoke with [facility] confirm patient new diagnoses for lung ca. Patient lives with spouse and is confuse at this time. Advise to speak with spouse. CM ask for PT/OT consult for discharge planning. 1157 CM attempt to call  left  at [phone]. CM attempt to contact  on [phone] patient answered CM ask to speak with  patient states no. CM ask to confirm current address patient states [facility]. CM will wait for  to call back. [address] 
[phone]

## 2020-09-01 NOTE — CONSULTS
[facility] Pulmonary, Critical Care, and Sleep Medicine Initial Patient Consult Name: Francy Santa MRN: [id number] : 1955 Hospital: [facility] Date: 2020 IMPRESSION:  
· Likely metastatic lung cancer · Brain metastases · Generalized weakness · H/O tobacco use RECOMMENDATIONS:  
· Would choose least invasive biopsy procedure that will give an adequate diagnosis. In this case that would be the palpable supraclavicular lymph node on the left · Discussed with oncology and with Dr. Sherri Castillo who will see the patient and arrange biopsy · Discussed with patient and  · Management of cancer will be guided by oncology recommendations Subjective: This patient has been seen and evaluated at the request of Dr. Johnson Leonardo for lung cancer. Patient is a 60 y.o. female former smoker who was being evaluated for falls and weight loss. Workup revealed what appears to be widely metastatic lung cancer with brain, liver metastases. I have been asked to see her for evaluation for possible biopsy. She denies dyspnea, cough, chest pain. Past Medical History:  
Diagnosis Date  Hypertension History reviewed. No pertinent surgical history. Prior to Admission medications Medication Sig Start Date End Date Taking? Authorizing Provider  
losartan (COZAAR) 50 mg tablet Take 50 mg by mouth daily. Yes Other, MD Timothy  
 
Allergies Allergen Reactions  Pcn [Penicillins] Hives Social History Tobacco Use  Smoking status: Never Smoker  Smokeless tobacco: Never Used Substance Use Topics  Alcohol use: Never Frequency: Never Family History Problem Relation Age of Onset  Hypertension Mother  Diabetes Father  No Known Problems Sister  No Known Problems Brother  No Known Problems Other Current Facility-Administered Medications Medication Dose Route Frequency  zinc oxide-cod liver oil (DESITIN) 40 % paste   Topical TID  sodium chloride (NS) flush 5-40 mL  5-40 mL IntraVENous Q8H  
 enoxaparin (LOVENOX) injection 40 mg  40 mg SubCUTAneous DAILY  famotidine (PEPCID) tablet 20 mg  20 mg Oral BID  losartan (COZAAR) tablet 100 mg  100 mg Oral DAILY  dexamethasone (DECADRON) 4 mg/mL injection 4 mg  4 mg IntraVENous Q6H Review of Systems: A comprehensive review of systems was negative except for that written in the HPI. Objective:  
Vital Signs:   
Visit Vitals /84 Pulse 84 Temp 98.4 °F (36.9 °C) Resp 18 Ht 5' 9\" (1.753 m) Wt 104.3 kg (230 lb) SpO2 99% Breastfeeding No  
BMI 33.97 kg/m² O2 Device: Room air Temp (24hrs), Av.1 °F (36.7 °C), Min:98 °F (36.7 °C), Max:98.4 °F (36.9 °C) Intake/Output:  
Last shift:      No intake/output data recorded. Last 3 shifts: 1901 -  0700 In: 1000 [I.V.:1000] Out: 800 [Urine:800] Intake/Output Summary (Last 24 hours) at 2020 1517 Last data filed at 2020 1857 Gross per 24 hour Intake  Output 400 ml Net -400 ml Physical Exam:  
General:  Alert, cooperative, no distress, appears stated age. Head:  Normocephalic, without obvious abnormality, atraumatic. Eyes:  Conjunctivae/corneas clear. Nose: Nares normal. Septum midline. Mucosa normal.   
Throat: Lips, mucosa, and tongue normal. Teeth and gums normal.  
Neck: Supple, symmetrical, trachea midline Back:   Symmetric, no curvature. ROM normal.  
Lungs:   Decreased breath sounds in the left upper lung field Chest wall:  No tenderness or deformity. Heart:  Regular rate and rhythm Abdomen:   Soft, non-tender. Bowel sounds normal.   
Extremities: Extremities normal, atraumatic, no cyanosis or edema Skin: Skin color, texture, turgor normal. No rashes or lesions Lymph nodes: +firm, painless, fixed left supraclavicular lymph node. Neurologic: Grossly nonfocal  
 
Data review: Recent Results (from the past 24 hour(s)) LIPID PANEL Collection Time: 09/01/20  2:54 AM  
Result Value Ref Range LIPID PROFILE Cholesterol, total 130 <200 MG/DL Triglyceride 70 <150 MG/DL  
 HDL Cholesterol 38 MG/DL  
 LDL, calculated 78 0 - 100 MG/DL VLDL, calculated 14 MG/DL  
 CHOL/HDL Ratio 3.4 0.0 - 5.0 METABOLIC PANEL, COMPREHENSIVE Collection Time: 09/01/20  2:54 AM  
Result Value Ref Range Sodium 132 (L) 136 - 145 mmol/L Potassium 3.3 (L) 3.5 - 5.1 mmol/L Chloride 98 97 - 108 mmol/L  
 CO2 29 21 - 32 mmol/L Anion gap 5 5 - 15 mmol/L Glucose 132 (H) 65 - 100 mg/dL BUN 19 6 - 20 MG/DL Creatinine 0.51 (L) 0.55 - 1.02 MG/DL  
 BUN/Creatinine ratio 37 (H) 12 - 20 GFR est AA >60 >60 ml/min/1.73m2 GFR est non-AA >60 >60 ml/min/1.73m2 Calcium 9.1 8.5 - 10.1 MG/DL Bilirubin, total 0.9 0.2 - 1.0 MG/DL  
 ALT (SGPT) 58 12 - 78 U/L  
 AST (SGOT) 38 (H) 15 - 37 U/L Alk. phosphatase 106 45 - 117 U/L Protein, total 6.6 6.4 - 8.2 g/dL Albumin 2.9 (L) 3.5 - 5.0 g/dL Globulin 3.7 2.0 - 4.0 g/dL A-G Ratio 0.8 (L) 1.1 - 2.2    
CBC WITH AUTOMATED DIFF Collection Time: 09/01/20  2:54 AM  
Result Value Ref Range WBC 10.6 3.6 - 11.0 K/uL  
 RBC 5.15 3.80 - 5.20 M/uL  
 HGB 13.5 11.5 - 16.0 g/dL HCT 42.0 35.0 - 47.0 % MCV 81.6 80.0 - 99.0 FL  
 MCH 26.2 26.0 - 34.0 PG  
 MCHC 32.1 30.0 - 36.5 g/dL  
 RDW 15.5 (H) 11.5 - 14.5 % PLATELET 834 841 - 630 K/uL MPV 9.7 8.9 - 12.9 FL  
 NRBC 0.0 0  WBC ABSOLUTE NRBC 0.00 0.00 - 0.01 K/uL NEUTROPHILS 90 (H) 32 - 75 % LYMPHOCYTES 4 (L) 12 - 49 % MONOCYTES 5 5 - 13 % EOSINOPHILS 0 0 - 7 % BASOPHILS 0 0 - 1 % IMMATURE GRANULOCYTES 1 (H) 0.0 - 0.5 % ABS. NEUTROPHILS 9.6 (H) 1.8 - 8.0 K/UL  
 ABS. LYMPHOCYTES 0.4 (L) 0.8 - 3.5 K/UL  
 ABS. MONOCYTES 0.5 0.0 - 1.0 K/UL  
 ABS. EOSINOPHILS 0.0 0.0 - 0.4 K/UL  
 ABS. BASOPHILS 0.0 0.0 - 0.1 K/UL ABS. IMM. GRANS. 0.1 (H) 0.00 - 0.04 K/UL  
 DF SMEAR SCANNED    
 RBC COMMENTS MICROCYTOSIS 
1+ Imaging: 
I have personally reviewed the patients radiographs and have reviewed the reports: 
Large left lung mass, liver mets, brain mets Lindy Gallegos MD

## 2020-09-02 NOTE — PROGRESS NOTES
TRANSFER - IN REPORT: 
 
Verbal report received from [facility] on Lucas Valeria  being received from 3101 for ordered procedure Report consisted of patients Situation, Background, Assessment and  
Recommendations(SBAR). Information from the following report(s) SBAR, ED Summary, Procedure Summary, Intake/Output and MAR was reviewed with the receiving nurse. Opportunity for questions and clarification was provided. Assessment completed upon patients arrival to unit and care assumed.

## 2020-09-02 NOTE — BRIEF OP NOTE
Brief Postoperative Note Patient: Willie Perez YOB: 1955 MRN: [id number] Date of Procedure: 9/2/2020 Pre-Op Diagnosis:  Supraclavicular lymphadenopathy Post-Op Diagnosis: Same as preoperative diagnosis. Procedure(s): LEFT SUPERCLAVICULAR  LYMPH NODE BIOPSY Surgeon(s): 
Shanae Romero MD 
 
Surgical Assistant: None Anesthesia: General  
 
Estimated Blood Loss (mL): Minimal 
 
Complications: None Specimens:  
ID Type Source Tests Collected by Time Destination 1 : left superclavicular lymph node Preservative Neck  Shanae Romero MD 9/2/2020 1615 Pathology Implants: * No implants in log * Drains:  
External Female Catheter 08/31/20 (Active) Site Assessment Clean, dry, & intact 08/31/20 1658 Repositioned Yes 08/31/20 1658 Perineal Care Yes 08/31/20 1658 Constanza Pal Changed Yes 08/31/20 1658 Suction Canister/Tubing Changed Yes 08/31/20 1658 Findings: large 3cm firm LN. Electronically Signed by Jimi Cuello MD on 9/2/2020 at 4:36 PM

## 2020-09-02 NOTE — PROGRESS NOTES
Hospitalist Progress Note NAME: Carley Blanco :  1955 MRN:  [id number] Assessment / Plan: 
 
42-year-old female complaining of a few months of generalized weakness, frequent falls, extremity weakness especially in the legs, weight loss, confusion and forgetfulness. On exam Mild tremor.  4/5 strength bilateral lower extremities. Of note she just had a recent head CT  Head on   which was normal, just showed microvascular changes.  Her H&P is not at all suggestive of acute CVA, no indication for CT angiography at this time. Labs reassuring Except k 3.3 . Per patient she also lost almost 30 pounds of weight in last couple of month because she did not feel like eating. Patient has history of gastric sleeve for weight loss 4 years ago. Patient denied to be depressed. 
  
  
Generalized weaknesss more in Bilateral leg weakness( intermittent) poa Weight loss poa Mild tremors Concern for metastatic lung ca Nonfocal exam. Progressive symptoms for months. CT scan a couple weeks ago nonspecific. MRI c spine No acute abnormality. 2. Mild spinal stenosis at C4-5. Neural foraminal narrowing as above. RI cervical spine : 
 
 MRI  Lumbar spine showed : 
1. Several scattered osseous metastases. 2. Multilevel spondylosis as above MRI brain : 
1. Extensive leptomeningeal enhancement as well as several subtle areas of 
parenchymal enhancement, highly suspicious for metastatic disease given findings 
on chest CT earlier today suspicious for bronchogenic carcinoma. 2. Extensive chronic white matter disease and areas of remote infarction, with 
no acute intracranial hemorrhage or infarction. - 
- tsh wnl hiv b12 folate wnl   rpr non reactive  ua utox negative    cpk wnl   a1c 5.3 FLP 
-We will check zinc and selenium as patient has history of gastric sleeve operation to rule out deficiency 
-neuro consulted 
-pt/ot consulted Going for LN biopsy Ct scan chest : 
 IMPRESSION: Advanced lung carcinoma: 
1. Large left upper lobe lung carcinoma. 2. Numerous jany metastases, including contralateral mediastinal and 
ipsilateral supraclavicular. 3. Diffuse, bilateral, pulmonary metastases. 4. Scattered osseous metastases. 5. Few hepatic metastases. CT chest : 
 
 consult [facility] 
  
Hypokalemia- repleted  Monitor   Mag wnl . 
  
Uncontrolled HTN-172/91  Resume home meds  And give iv hydralazine prn 
  
  
Code Status: full Surrogate Decision Maker: Caro Alexander  Spouse  [phone]   
  
  
  
DVT Prophylaxis: sq lovenox GI Prophylaxis: not indicated 
  
Baseline: independent 30.0 - 39.9 Obese / Body mass index is 33.97 kg/m². Subjective: Chief Complaint / Reason for Physician Visit FU leg weakness . pt waiting bx Review of Systems: 
Symptom Y/N Comments  Symptom Y/N Comments Fever/Chills n   Chest Pain n   
Poor Appetite    Edema Cough n   Abdominal Pain n   
Sputum n   Joint Pain SOB/MEZA    Pruritis/Rash Nausea/vomit n   Tolerating PT/OT Diarrhea    Tolerating Diet y Constipation    Other Could NOT obtain due to:   
 
Objective: VITALS:  
Last 24hrs VS reviewed since prior progress note. Most recent are: 
Patient Vitals for the past 24 hrs: 
 Temp Pulse Resp BP SpO2  
09/02/20 1042 98.5 °F (36.9 °C) 85 18 (!) 182/93 97 % 09/02/20 0655 97.6 °F (36.4 °C) 77 18 153/87 100 % 09/02/20 0400  73     
09/02/20 0325 98.1 °F (36.7 °C) 87 18 167/80 100 % 09/02/20 0000  88     
09/01/20 2304 97.8 °F (36.6 °C) 78 18 159/87 98 % 09/01/20 2000  75     
09/01/20 1908 98 °F (36.7 °C) 94 18 177/87 98 % 09/01/20 1537 97.9 °F (36.6 °C) 95 18 170/90 97 % 09/01/20 1145 98.4 °F (36.9 °C) 84 18 160/84 99 % Intake/Output Summary (Last 24 hours) at 9/2/2020 1111 Last data filed at 9/2/2020 4494 Gross per 24 hour Intake  Output 400 ml Net -400 ml PHYSICAL EXAM: 
 General: WD, WN. Alert, cooperative, no acute distress   
EENT:  EOMI. Anicteric sclerae. MMM Resp:  CTA bilaterally, no wheezing or rales. No accessory muscle use CV:  Regular  rhythm,  No edema GI:  Soft, Non distended, Non tender.  +Bowel sounds Neurologic:  Alert and oriented X 3, normal speech, Psych:   Poor insight Skin:  No rashes. No jaundice Reviewed most current lab test results and cultures  YES Reviewed most current radiology test results   YES Review and summation of old records today    NO Reviewed patient's current orders and MAR    YES 
PMH/SH reviewed - no change compared to H&P 
________________________________________________________________________ Care Plan discussed with: 
  Comments Patient x Family RN x Care Manager Consultant Multidiciplinary team rounds were held today with , nursing, pharmacist and clinical coordinator. Patient's plan of care was discussed; medications were reviewed and discharge planning was addressed. ________________________________________________________________________ Total NON critical care TIME: 35   Minutes Total CRITICAL CARE TIME Spent:   Minutes non procedure based Comments >50% of visit spent in counseling and coordination of care x   
________________________________________________________________________ Norco MD Fatoumata  
 
Procedures: see electronic medical records for all procedures/Xrays and details which were not copied into this note but were reviewed prior to creation of Plan. LABS: 
I reviewed today's most current labs and imaging studies. Pertinent labs include: 
Recent Labs  
  09/01/20 
0254 08/31/20 
0232 08/30/20 
2227 WBC 10.6 10.4 9.3 HGB 13.5 12.8 13.7 HCT 42.0 39.9 42.8  292 235 Recent Labs  
  09/01/20 
0254 08/31/20 
0232 08/30/20 2227 * 135* 134* K 3.3* 3.2* 3.3*  
CL 98 100 98 CO2 29 30 33* * 105* 126* BUN 19 19 21* CREA 0.51* 0.53* 0.59 CA 9.1 8.3* 9.1 MG  --  2.1  --   
ALB 2.9* 2.8* 3.0* TBILI 0.9 0.9 1.0 ALT 58 60 65 INR  --  1.2*  --   
 
 
Signed: Lotus Freitas MD

## 2020-09-02 NOTE — PROGRESS NOTES
Returned form OR Drowsy but alert and oriented. Denies pain.  Dermabond dressing over inc on left side of chest.

## 2020-09-02 NOTE — ANESTHESIA POSTPROCEDURE EVALUATION
Procedure(s): LEFT SUPERCLAVICULAR  LYMPH NODE BIOPSY. general 
 
Anesthesia Post Evaluation Patient location during evaluation: PACU Note status: Adequate. Level of consciousness: responsive to verbal stimuli and sleepy but conscious Pain management: satisfactory to patient Airway patency: patent Anesthetic complications: no 
Cardiovascular status: acceptable Respiratory status: acceptable Hydration status: acceptable Comments: +Post-Anesthesia Evaluation and Assessment Patient: Francy Santa MRN: [id number]  SSN: [id number] YOB: 1955  Age: 60 y.o. Sex: female Cardiovascular Function/Vital Signs /71   Pulse 78   Temp 36.9 °C (98.4 °F)   Resp 14   Ht 5' 9\" (1.753 m)   Wt 104.3 kg (230 lb)   SpO2 100%   Breastfeeding No   BMI 33.97 kg/m² Patient is status post Procedure(s): LEFT SUPERCLAVICULAR  LYMPH NODE BIOPSY. Nausea/Vomiting: Controlled. Postoperative hydration reviewed and adequate. Pain: 
Pain Scale 1: Numeric (0 - 10) (09/02/20 1745) Pain Intensity 1: 0 (09/02/20 1745) Managed. Neurological Status:  
Neuro (WDL): Exceptions to WDL (09/02/20 1647) At baseline. Mental Status and Level of Consciousness: Arousable. Pulmonary Status:  
O2 Device: Nasal cannula (09/02/20 1745) Adequate oxygenation and airway patent. Complications related to anesthesia: None Post-anesthesia assessment completed. No concerns. Signed By: Marcia Barrera MD  
 9/2/2020 Post anesthesia nausea and vomiting:  controlled INITIAL Post-op Vital signs:  
Vitals Value Taken Time /71 9/2/2020  5:45 PM  
Temp 36.9 °C (98.4 °F) 9/2/2020  4:52 PM  
Pulse 77 9/2/2020  5:47 PM  
Resp 16 9/2/2020  5:47 PM  
SpO2 100 % 9/2/2020  5:47 PM  
Vitals shown include unvalidated device data.

## 2020-09-02 NOTE — NURSE NAVIGATOR
ONCOLOGY NURSE NAVIGATOR Candelario Calderón 59 yo  X17 yrs, 1 dtr (lives in [place]) spouse has 3 children (live out of state) FINANCIAL: Employed has been working from home since [place], now on STD. Works for "[employer] center. Spouse receives SS. Pt was going to retire in the next year or so. Live in 2 story home. EMOTIONAL: somnolent, smiles, answers questions. Spouse tearful when speaking about possible cancer and how she's been sick since June. \"I've picked her up off the floor 12-13 times\" Due to his back/neck surgeries his unable to do much lifting. Lymph node bx in OR today Provided w/ contact information and educated on role. Discussed w/ Medical Oncology NP. Will work on AMD with pt/spouse. Pt C/O HANKS, has Tylenol PRN ordered. Palliative referral placed. States she's been having HA, which caused her to not be able to work. Spouse reports she would work for 30 minutes and sleep for 3 hrs, she sleeps a lot day and night. ONN will follow. Tanya Rg RN  
EXT 1017

## 2020-09-02 NOTE — PROGRESS NOTES
Physical Therapy Chart reviewed for updates and attempted to see patient for PT treatment. Patient sidelying in bed with  at bedside. She reports \"feeling miserable\" and with c/o headache. Verbalized agreeable to therapy but when patient asked to sit EOB she shook her head no and stated \"not today. \" Assisting patient with repositioning in bed and attempting to encourage her to participate, when transport arrived to take patient to OR for biopsy. Session aborted. Will continue to follow.   
Chela Joaquin, PT, DPT

## 2020-09-02 NOTE — PROGRESS NOTES
Occupational Therapy: 
09/02/20 Orders received and appreciated, chart reviewed. Pt received in bed with  at bedside. She is agreeable to minimal activity at bed level secondary to HA. She requires min A to reposition in bed to modified seated position with HOB elevated. She requires increased time to process commands but is able to respond to questions appropriately. Transport then arrived to take pt GABRIELA to OR for planned biopsy, therefore session aborted. Will continue to follow and complete OT eval on next tx day. Of note, pt and  reporting pt was highly independent, able to complete ADLs independently and mobilize without AD. 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 At baseline pt drives and takes care of IADLs. Pt with recent rapid decline including multiple falls at home. Thank you, Thomas Morales, OTR/L Time spent with pt: 10 minutes

## 2020-09-02 NOTE — PROGRESS NOTES
[facility] Pulmonary, Critical Care, and Sleep Medicine Name: Whit Mclean MRN: [id number] : 1955 Hospital: [facility] Date: 2020 IMPRESSION:  
· Likely metastatic lung cancer · Brain metastases · Generalized weakness · H/O tobacco use RECOMMENDATIONS:  
· LN biopsy today · Management of cancer will be guided by pathology results and oncology recommendations Subjective:  
 
20: no events. LN biopsy planned for today. Initial history: This patient has been seen and evaluated at the request of Dr. Alexander Wu for lung cancer. Patient is a 60 y.o. female former smoker who was being evaluated for falls and weight loss. Workup revealed what appears to be widely metastatic lung cancer with brain, liver metastases. I have been asked to see her for evaluation for possible biopsy. She denies dyspnea, cough, chest pain. Past Medical History:  
Diagnosis Date  Hypertension History reviewed. No pertinent surgical history. Prior to Admission medications Medication Sig Start Date End Date Taking? Authorizing Provider  
losartan (COZAAR) 50 mg tablet Take 50 mg by mouth daily. Yes Other, MD Timothy  
 
Allergies Allergen Reactions  Pcn [Penicillins] Hives Social History Tobacco Use  Smoking status: Never Smoker  Smokeless tobacco: Never Used Substance Use Topics  Alcohol use: Never Frequency: Never Family History Problem Relation Age of Onset  Hypertension Mother  Diabetes Father  No Known Problems Sister  No Known Problems Brother  No Known Problems Other Current Facility-Administered Medications Medication Dose Route Frequency  cefTRIAXone (ROCEPHIN) 1 g in 0.9% sodium chloride (MBP/ADV) 50 mL  1 g IntraVENous Q24H  
 zinc oxide-cod liver oil (DESITIN) 40 % paste   Topical TID  sodium chloride (NS) flush 5-40 mL  5-40 mL IntraVENous Q8H  
  enoxaparin (LOVENOX) injection 40 mg  40 mg SubCUTAneous DAILY  famotidine (PEPCID) tablet 20 mg  20 mg Oral BID  losartan (COZAAR) tablet 100 mg  100 mg Oral DAILY  dexamethasone (DECADRON) 4 mg/mL injection 4 mg  4 mg IntraVENous Q6H Review of Systems: A comprehensive review of systems was negative except for that written in the HPI. Objective:  
Vital Signs:   
Visit Vitals /87 (BP 1 Location: Right arm, BP Patient Position: At rest) Pulse 77 Temp 97.6 °F (36.4 °C) Resp 18 Ht 5' 9\" (1.753 m) Wt 104.3 kg (230 lb) SpO2 100% Breastfeeding No  
BMI 33.97 kg/m² O2 Device: Room air Temp (24hrs), Av °F (36.7 °C), Min:97.6 °F (36.4 °C), Max:98.4 °F (36.9 °C) Intake/Output:  
Last shift:      No intake/output data recorded. Last 3 shifts:  1901 -  0700 In: -  
Out: 400 [Urine:400] Intake/Output Summary (Last 24 hours) at 2020 1034 Last data filed at 2020 9157 Gross per 24 hour Intake  Output 400 ml Net -400 ml Physical Exam:  
General:  Alert, cooperative, no distress, appears stated age. Head:  Normocephalic, without obvious abnormality, atraumatic. Eyes:  Conjunctivae/corneas clear. Nose: Nares normal. Septum midline. Mucosa normal.   
Throat: Lips, mucosa, and tongue normal. Teeth and gums normal.  
Neck: Supple, symmetrical, trachea midline Lungs:   Decreased breath sounds in the left upper lung field Chest wall:  No tenderness or deformity. Heart:  Regular rate and rhythm Abdomen:   Soft, non-tender. Bowel sounds normal.   
Extremities: Extremities normal, atraumatic, no cyanosis or edema Skin: Skin color, texture, turgor normal. No rashes or lesions Lymph nodes: +firm, painless, fixed left supraclavicular lymph node. Neurologic: Grossly nonfocal  
 
Data review:  
 
Recent Results (from the past 24 hour(s)) LIPID PANEL Collection Time: 20  3:26 AM  
Result Value Ref Range LIPID PROFILE Cholesterol, total 134 <200 MG/DL Triglyceride 69 <150 MG/DL  
 HDL Cholesterol 41 MG/DL  
 LDL, calculated 79.2 0 - 100 MG/DL VLDL, calculated 13.8 MG/DL  
 CHOL/HDL Ratio 3.3 0.0 - 5.0 Imaging: 
I have personally reviewed the patients radiographs and have reviewed the reports: 
None today Marcy Lim MD

## 2020-09-02 NOTE — PERIOP NOTES
1500 - SBAR IN NOTE:  PT ARRIVED INTO PRE-OP HOLDING ROM 17 - PT A&O X4. ESCOBEDO SPON AND TO COMMAND. PT C/O INCREASED WEAKNESS IN ARMS AND LEGS. NOTICEABLE WEAKNESS CHRISTIANA LE WHEN FLEXING FEET TOWARD HEAD. RESP EVEN AND UNLABORED. POX  ON RA > 96%. BSB AND CLEAR ON AUSC. RFA IV SITE BENIGN. PT C/O 3/10 HEADACHE, HOWEVER ABLE TO REST IN NAPS. SR UP X4 AND CB IN PLACE. WAITING SURGERY.

## 2020-09-02 NOTE — ROUTINE PROCESS
Bedside and Verbal shift change report given to Rabia (oncoming nurse) by [place] nurse). Report included the following information SBAR, Kardex, Intake/Output and MAR. Zone Phone:   [phone] Significant changes during shift:  To OR for LUNG BX 
 
 
 
Patient Information Carley Blanco 60 y.o. 
8/30/2020  9:13 PM by Monisha Morgan MD. Carley Blanco was admitted from Home 
 
Problem List 
 
Patient Active Problem List  
 Diagnosis Date Noted  Hypokalemia 08/31/2020  Confusion 08/31/2020  Tremor 08/31/2020  Weakness 08/31/2020  Metastatic lung cancer (metastasis from lung to other site) [facility]) 08/31/2020  Carcinoma of cerebral meninges ([facility] [address] 08/31/2020  Paraplegic gait 08/31/2020  Acute alteration in mental status 08/31/2020  Convulsive syncope 08/31/2020 Past Medical History:  
Diagnosis Date  Hypertension Core Measures: CVA: Yes Yes Activity Status: OOB to Chair No 
Ambulated this shift NO Bed Rest NO 
 
DVT prophylaxis: DVT prophylaxis Med- Yes held this am for OR 
DVT prophylaxis SCD or NORM- No  
 
Wounds: (If Applicable) Wounds- Yes Location sacrum PINK questionably blanchable Patient Safety: 
 
Falls Score Total Score: 5 Safety Level_______ Bed Alarm On? Yes Sitter? No 
 
Plan for upcoming shift: MRI of c spine. Palliative consult tomorrow Discharge Plan: No  TBD Active Consults: 
IP CONSULT TO HOSPITALIST 
IP CONSULT TO NEUROLOGY 
IP CONSULT TO HEMATOLOGY 
IP CONSULT TO PULMONOLOGY 
IP CONSULT TO GENERAL SURGERY

## 2020-09-02 NOTE — PROGRESS NOTES
Speech pathology Attempted to follow up with patient to determine appropriateness of completing integrated language eval. Patient currently GABRIELA for biopsy planned for later today.  
Shanda Amato M.S. [facility]-SLP

## 2020-09-02 NOTE — PROGRESS NOTES
Spoke to Jia Ramirez in [facility] who stated pt could have decadron , cozaar and pepcid but to hold lovenox

## 2020-09-02 NOTE — PERIOP NOTES
Dev Morrow from Operating Room to PACU Report received from [facility] and A Wass CRNA regarding Bin Cos. Surgeon(s): 
Tanesha Sauceda MD  And Procedure(s) (LRB): LEFT SUPERCLAVICULAR  LYMPH NODE BIOPSY (Left)  confirmed  
with allergies and dressings discussed. Anesthesia type, drugs, patient history, complications, estimated blood loss, vital signs, intake and output, and last pain medication, lines and temperature were reviewed. 250 [facility] for pCXR STAT per Dr Teretha Dakin. [facility]: 
No pneumothorax status post supraclavicular node biopsy. Stable left 
apical/superhilar lung mass. Stable pulmonary nodules. .   
 
0597 TRANSFER - OUT REPORT: 
 
Verbal report given to [facility] - ROWENA RN(name) on Bin Cos  being transferred to Neuro(unit) for routine post - op Report consisted of patients Situation, Background, Assessment and  
Recommendations(SBAR). Information from the following report(s) SBAR, Kardex, OR Summary, Procedure Summary, Intake/Output and MAR was reviewed with the receiving nurse. Opportunity for questions and clarification was provided. Patient transported with: 
 Monitor O2 @ 2 liters Registered Nurse Tech Patient chart No belongings in PACU with patient.

## 2020-09-02 NOTE — PROGRESS NOTES
[facility] 
at [place] [address], [facility], [address] 
[phone] Hematology/ Oncology Consult Note Reason for consult:  
 
Francy Santa is a 60 y.o. female who we have been asked to see by Dr. Johnson Leonardo for metastatic cancer. Subjective:  
 
Francy Santa is a 60year old female who presented to the ED on 8/30 with complaint of generalized weakness with frequent falls. She also says she has intermittent weakness of her lower extremities, greater on the right. MRI Brain showed extensive leptomeningeal enhancement. CT chest showed a large left upper lobe lung mass, contralateral mediastinal and ipsilateral supraclavicular node, hepatic metastases and scattered osseous metastases. She has had a 30 lb unintentional weight loss and mental confusion. She denies headache. 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 Her last mammogram was in 2015. Interval History: Ms. Paco Alexander is scheduled for her LN biopsy this afternoon. She is complaining of a headache and appears more confused. Her  is at the bedside and is emotional about what has transpired over the past few weeks. Review of Systems: A comprehensive review of systems was negative except for that written in the History of Present Illness. Past Medical History:  
Diagnosis Date  Hypertension Past Surgical History:  
Procedure Laterality Date  HX APPENDECTOMY  HX CHOLECYSTECTOMY  HX HYSTERECTOMY  HX TONSIL AND ADENOIDECTOMY Family History Problem Relation Age of Onset  Hypertension Mother  Diabetes Father  No Known Problems Sister  No Known Problems Brother  No Known Problems Other Social History Tobacco Use  Smoking status: Never Smoker  Smokeless tobacco: Never Used Substance Use Topics  Alcohol use: Never Frequency: Never Current Facility-Administered Medications Medication Dose Route Frequency Provider Last Rate Last Dose  lactated Ringers infusion  25 mL/hr IntraVENous CONTINUOUS Slime Ramos MD      
 sodium chloride (NS) flush 5-40 mL  5-40 mL IntraVENous Q8H Slime Ramos MD      
 sodium chloride (NS) flush 5-40 mL  5-40 mL IntraVENous PRN Slime Ramos MD      
 lidocaine (PF) (XYLOCAINE) 10 mg/mL (1 %) injection 0.1 mL  0.1 mL SubCUTAneous PRN Slime Ramos MD      
 cefTRIAXone (ROCEPHIN) 1 g in 0.9% sodium chloride (MBP/ADV) 50 mL  1 g IntraVENous Q24H Deepti Jordan  mL/hr at 09/02/20 1152 1 g at 09/02/20 1152  zinc oxide-cod liver oil (DESITIN) 40 % paste   Topical TID Deepti Jordan MD      
 sodium chloride (NS) flush 5-40 mL  5-40 mL IntraVENous Q8H Issac Alonzo MD   10 mL at 09/02/20 1441  sodium chloride (NS) flush 5-40 mL  5-40 mL IntraVENous PRN Clinton, Erickson Gordon MD      
 acetaminophen (TYLENOL) tablet 650 mg  650 mg Oral Q6H PRN Malena Aguilar MD   650 mg at 09/02/20 1150 Or  acetaminophen (TYLENOL) suppository 650 mg  650 mg Rectal Q6H PRN Din, Erickson Gordon MD      
 polyethylene glycol (MIRALAX) packet 17 g  17 g Oral DAILY PRN Erickson Alonzo MD      
 promethazine (PHENERGAN) tablet 12.5 mg  12.5 mg Oral Q6H PRN Erickson Alonzo MD      
 Or  
 ondansetron (ZOFRAN) injection 4 mg  4 mg IntraVENous Q6H PRN Erickson Alonzo MD      
 enoxaparin (LOVENOX) injection 40 mg  40 mg SubCUTAneous DAILY Din, Erickson Gordon MD   Stopped at 09/02/20 0900  famotidine (PEPCID) tablet 20 mg  20 mg Oral BID Malena Aguilar MD   20 mg at 09/02/20 8207  losartan (COZAAR) tablet 100 mg  100 mg Oral DAILY Deepti Jordan MD   100 mg at 09/02/20 0941  
 dexamethasone (DECADRON) 4 mg/mL injection 4 mg  4 mg IntraVENous Q6H Rita Krishnan MD   4 mg at 09/02/20 1152 Allergies Allergen Reactions  Pcn [Penicillins] Hives Objective:  
 
Patient Vitals for the past 8 hrs: 
 BP Temp Pulse Resp SpO2 Height Weight 09/02/20 1500 (!) 172/94 98.4 °F (36.9 °C) 87 22 98 % 5' 9\" (1.753 m) 230 lb (104.3 kg) 09/02/20 1144 167/71  74      
09/02/20 1042 (!) 182/93 98.5 °F (36.9 °C) 85 18 97 %   Lab Results Component Value Date/Time WBC 10.6 09/01/2020 02:54 AM  
 HGB 13.5 09/01/2020 02:54 AM  
 HCT 42.0 09/01/2020 02:54 AM  
 PLATELET 349 16/24/1114 02:54 AM  
 MCV 81.6 09/01/2020 02:54 AM  
 
 
Physical Exam:  
General appearance: alert, cooperative, no distress, appears stated age Head: Normocephalic, without obvious abnormality, atraumatic Neck: supple, symmetrical, trachea midline and no adenopathy Lungs: clear to auscultation bilaterally Heart: regular rate and rhythm Abdomen: soft, non-tender. Bowel sounds normal. No masses,  no organomegaly Extremities: bilateral lower extremity weakness R>L Skin: ecchymoses - scattered Lymph nodes: Supraclavicular adenopathy: right. Neurologic: A and O x 4 MRI Results (most recent): 
 
Brain: 8/31/2020 Impression: 1. Extensive leptomeningeal enhancement as well as several subtle areas of 
parenchymal enhancement, highly suspicious for metastatic disease given findings 
on chest CT earlier today suspicious for bronchogenic carcinoma. 2. Extensive chronic white matter disease and areas of remote infarction, with 
no acute intracranial hemorrhage or infarction. 
  
CT Results (most recent): 
Results from Hospital Encounter encounter on 08/30/20 CT CHEST W CONT Narrative CT CHEST WITH CONTRAST. 8/31/2020 4:16 AM  
 
INDICATION: Left upper lobe mass suspected on chest radiograph. COMPARISON: Same day chest radiograph. TECHNIQUE: CT of the chest was performed after the administration 100 cc IV Isovue-370. Coronal and sagittal reconstructions were performed. CT dose 
reduction was achieved through use of a standardized protocol tailored for this examination and automatic exposure control for dose modulation. FINDINGS: 
A large left upper lobe lung carcinoma measures 66 x 67 x 88 mm. It replaces the 
left upper lobe, with the exception of the inferior lingular segment. Numerous 
jany metastases include a left supraclavicular metastasis measuring 13 mm on 
image 8-301, a contralateral right superior mediastinal metastasis measuring 15 
mm (301-15), and a right precarinal/peribronchial metastasis measuring 22 mm 
(301-22). There are innumerable intrapulmonary metastases throughout the 
bilateral lungs. No overt pleural nodularity associated with a trace left 
pleural effusion. Mixed sclerotic and lucent osseous lesions in C7, T3, T6, T7, T8, T10, and T12 
are consistent with metastases. Small hypodense hepatic masses in segment 7 
likely also metastases. The central airways are patent, with the exception of the obliterated left upper 
lobe bronchus. No pneumothorax. The heart size is normal. Pulmonary arterial 
enlargement is likely secondary to lung carcinoma, as the left pulmonary artery 
is narrowed. Post sleeve gastrectomy and cholecystectomy. The visualized upper 
abdomen Impression IMPRESSION: Advanced lung carcinoma: 
1. Large left upper lobe lung carcinoma. 2. Numerous jany metastases, including contralateral mediastinal and 
ipsilateral supraclavicular. 3. Diffuse, bilateral, pulmonary metastases. 4. Scattered osseous metastases. 5. Few hepatic metastases. Assessment: 1. Metastatic cancer Favors lung primary 
large upper lobe lung mass, osseous metastases, leptomeningeal enhancement, hepatic metastases + 30 lb unintentional weight loss 
+ history or smoking - quit in 2004 Plan for left supraclavicular LN excisional biopsy today for tissue diagnosis and molecular studies. Discuss in detail with patient and  regarding poor prognosis.  The patient would like to proceed with obtaining a diagnosis. Will discuss a plan of care once we have a definitive diagnosis Plan: 1. CT Abd/pelvis to complete staging 2. Supraclavicular node excisional biopsy by Dr. Sami Armenta today for tissue diagnosis and molecular studies 3. Palliative medicine for symptom management as well as goals of care 4. Will discuss options once we have a definitive diagnosis Mary Garcia NP

## 2020-09-02 NOTE — PROGRESS NOTES
Bedside, Verbal and Written shift change report given to Arnulfo Small RN  (oncoming nurse) by [facility], RN (offgoing nurse).  Report included the following information SBAR, Kardex, MAR and Recent Results.

## 2020-09-03 NOTE — PROGRESS NOTES
[facility] 
at [facility], [facility], [address] 
[phone] Hematology/ Oncology Follow UP Note Reason for consult:  
 
Megan Calix is a 60 y.o. female who we have been asked to see by Dr. Bhanu Bravo for metastatic cancer. Subjective:  
 
Megan Calix is a 60year old female who presented to the ED on 8/30 with complaint of generalized weakness with frequent falls. She also says she has intermittent weakness of her lower extremities, greater on the right. MRI Brain showed extensive leptomeningeal enhancement. CT chest showed a large left upper lobe lung mass, contralateral mediastinal and ipsilateral supraclavicular node, hepatic metastases and scattered osseous metastases. She has had a 30 lb unintentional weight loss and mental confusion. She denies headache. 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 Her last mammogram was in 2015. She has undergone LN biopsy and the pathology is pending. She is unable to get out of bed. Review of Systems: A comprehensive review of systems was negative except for that written in the History of Present Illness. Past Medical History:  
Diagnosis Date  Hypertension Past Surgical History:  
Procedure Laterality Date  HX APPENDECTOMY  HX CHOLECYSTECTOMY  HX HYSTERECTOMY  HX TONSIL AND ADENOIDECTOMY Family History Problem Relation Age of Onset  Hypertension Mother  Diabetes Father  No Known Problems Sister  No Known Problems Brother  No Known Problems Other Social History Tobacco Use  Smoking status: Never Smoker  Smokeless tobacco: Never Used Substance Use Topics  Alcohol use: Never Frequency: Never Current Facility-Administered Medications Medication Dose Route Frequency Provider Last Rate Last Dose  [START ON 9/4/2020] amLODIPine (NORVASC) tablet 10 mg  10 mg Oral DAILY Soheila Valladares MD      
 labetaloL (NORMODYNE;TRANDATE) injection 20 mg  20 mg IntraVENous Q4H PRN Soheila Valladares MD      
 cefTRIAXone (ROCEPHIN) 1 g in 0.9% sodium chloride (MBP/ADV) 50 mL  1 g IntraVENous Q24H Soheila Valladares  mL/hr at 09/03/20 1317 1 g at 09/03/20 1317  
 alcohol 62% (NOZIN) nasal  1 Ampule  1 Ampule Topical Q12H Lisa Heart MD   1 Ampule at 09/02/20 2149  zinc oxide-cod liver oil (DESITIN) 40 % paste   Topical TID Soheila Valladares MD      
 sodium chloride (NS) flush 5-40 mL  5-40 mL IntraVENous Q8H Din, Issac CAZARES MD   10 mL at 09/03/20 1317  
 sodium chloride (NS) flush 5-40 mL  5-40 mL IntraVENous PRN Olive Fraser MD   10 mL at 09/02/20 2344  acetaminophen (TYLENOL) tablet 650 mg  650 mg Oral Q6H PRN Olive Fraser MD   650 mg at 09/02/20 1150 Or  acetaminophen (TYLENOL) suppository 650 mg  650 mg Rectal Q6H PRN Steve Alonzo MD      
 polyethylene glycol (MIRALAX) packet 17 g  17 g Oral DAILY PRN Steve Alonzo MD      
 promethazine (PHENERGAN) tablet 12.5 mg  12.5 mg Oral Q6H PRN Steve Alonzo MD      
 Or  
 ondansetron (ZOFRAN) injection 4 mg  4 mg IntraVENous Q6H PRN Steve Alonzo MD      
 enoxaparin (LOVENOX) injection 40 mg  40 mg SubCUTAneous DAILY Steve Alonzo MD   40 mg at 09/03/20 1018  famotidine (PEPCID) tablet 20 mg  20 mg Oral BID Olive Fraser MD   20 mg at 09/03/20 1807  losartan (COZAAR) tablet 100 mg  100 mg Oral DAILY Soheila Valladares MD   100 mg at 09/03/20 1017  
 dexamethasone (DECADRON) 4 mg/mL injection 4 mg  4 mg IntraVENous Q6H Senthil Burrell MD   4 mg at 09/03/20 1912 Allergies Allergen Reactions  Pcn [Penicillins] Hives Objective:  
 
Patient Vitals for the past 8 hrs: 
 BP Temp Pulse Resp SpO2  
09/03/20 1536 151/75 97.4 °F (36.3 °C) 82 24 99 % 09/03/20 1124 (!) 188/99  94    
09/03/20 1116 (!) 166/96 97.9 °F (36.6 °C) (!) 110 20 99 % Physical Exam:  
General appearance: alert, cooperative, no distress Head: Normocephalic, without obvious abnormality, atraumatic Neck: supple, symmetrical, trachea midline Lungs: clear to auscultation bilaterally Heart: regular rate and rhythm Abdomen: soft, non-tender. Bowel sounds normal. No masses,  no organomegaly Extremities: bilateral lower extremity weakness R>L Skin: ecchymoses - scattered Lymph nodes: Supraclavicular adenopathy: right. Neurologic: A and O x 4 MRI Results (most recent): 
 
Brain: 8/31/2020 Impression: 1. Extensive leptomeningeal enhancement as well as several subtle areas of 
parenchymal enhancement, highly suspicious for metastatic disease given findings 
on chest CT earlier today suspicious for bronchogenic carcinoma. 2. Extensive chronic white matter disease and areas of remote infarction, with 
no acute intracranial hemorrhage or infarction. 
  
CT Results (most recent): 
Results from Hospital Encounter encounter on 08/30/20 CT CHEST W CONT Narrative CT CHEST WITH CONTRAST. 8/31/2020 4:16 AM  
 
INDICATION: Left upper lobe mass suspected on chest radiograph. COMPARISON: Same day chest radiograph. TECHNIQUE: CT of the chest was performed after the administration 100 cc IV Isovue-370. Coronal and sagittal reconstructions were performed. CT dose 
reduction was achieved through use of a standardized protocol tailored for this 
examination and automatic exposure control for dose modulation. FINDINGS: 
A large left upper lobe lung carcinoma measures 66 x 67 x 88 mm. It replaces the 
left upper lobe, with the exception of the inferior lingular segment. Numerous 
jany metastases include a left supraclavicular metastasis measuring 13 mm on 
image 8-301, a contralateral right superior mediastinal metastasis measuring 15 mm (301-15), and a right precarinal/peribronchial metastasis measuring 22 mm 
(301-22). There are innumerable intrapulmonary metastases throughout the 
bilateral lungs. No overt pleural nodularity associated with a trace left 
pleural effusion. Mixed sclerotic and lucent osseous lesions in C7, T3, T6, T7, T8, T10, and T12 
are consistent with metastases. Small hypodense hepatic masses in segment 7 
likely also metastases. The central airways are patent, with the exception of the obliterated left upper 
lobe bronchus. No pneumothorax. The heart size is normal. Pulmonary arterial 
enlargement is likely secondary to lung carcinoma, as the left pulmonary artery 
is narrowed. Post sleeve gastrectomy and cholecystectomy. The visualized upper 
abdomen Impression IMPRESSION: Advanced lung carcinoma: 
1. Large left upper lobe lung carcinoma. 2. Numerous jany metastases, including contralateral mediastinal and 
ipsilateral supraclavicular. 3. Diffuse, bilateral, pulmonary metastases. 4. Scattered osseous metastases. 5. Few hepatic metastases. Lab Results Component Value Date/Time WBC 10.6 09/01/2020 02:54 AM  
 HGB 13.5 09/01/2020 02:54 AM  
 HCT 42.0 09/01/2020 02:54 AM  
 PLATELET 572 94/00/4379 02:54 AM  
 MCV 81.6 09/01/2020 02:54 AM  
 
 
Lab Results Component Value Date/Time Sodium 132 (L) 09/03/2020 02:55 AM  
 Potassium 4.0 09/03/2020 02:55 AM  
 Chloride 99 09/03/2020 02:55 AM  
 CO2 32 09/03/2020 02:55 AM  
 Anion gap 1 (L) 09/03/2020 02:55 AM  
 Glucose 144 (H) 09/03/2020 02:55 AM  
 BUN 21 (H) 09/03/2020 02:55 AM  
 Creatinine 0.50 (L) 09/03/2020 02:55 AM  
 BUN/Creatinine ratio 42 (H) 09/03/2020 02:55 AM  
 GFR est AA >60 09/03/2020 02:55 AM  
 GFR est non-AA >60 09/03/2020 02:55 AM  
 Calcium 8.9 09/03/2020 02:55 AM  
 Bilirubin, total 0.5 09/03/2020 02:55 AM  
 Alk.  phosphatase 97 09/03/2020 02:55 AM  
 Protein, total 6.4 09/03/2020 02:55 AM  
 Albumin 2.8 (L) 09/03/2020 02:55 AM  
 Globulin 3.6 09/03/2020 02:55 AM  
 A-G Ratio 0.8 (L) 09/03/2020 02:55 AM  
 ALT (SGPT) 44 09/03/2020 02:55 AM  
 AST (SGOT) 26 09/03/2020 02:55 AM  
 
 
 
Assessment: 1. Metastatic cancer Favors lung primary 
large upper lobe lung mass, osseous metastases, leptomeningeal enhancement, hepatic metastases + 30 lb unintentional weight loss 
+ history or smoking - quit in 2004 Plan for left supraclavicular LN excisional biopsy today for tissue diagnosis and molecular studies. Discuss in detail with patient and  regarding poor prognosis. Biopsy performed Pathology pending Plan:  
 
 
1. Wait for pathology report 2. I introduced the concept of Hospice care Signed by: Louis Saldaña MD 
                   September 3, 2020

## 2020-09-03 NOTE — PROGRESS NOTES
Spiritual Care Assessment/Progress Note [place] 
 
 
NAME: Anatoliy Reyna      MRN: [id number] AGE: 60 y.o. SEX: female Congregational Affiliation: No preference Language: [place] 9/3/2020     Total Time (in minutes): 8 Spiritual Assessment begun in [facility]LE[facility] through conversation with: 
  
    [x]Patient        [x] Family    [] Friend(s) Reason for Consult: Palliative Care, Initial/Spiritual Assessment Spiritual beliefs: (Please include comment if needed) 
   [] Identifies with a ivana tradition:     
   [] Supported by a ivana community:        
   [] Claims no spiritual orientation:       
   [] Seeking spiritual identity:            
   [] Adheres to an individual form of spirituality:       
   [x] Not able to assess:                   
 
    
Identified resources for coping:  
   [] Prayer                           
   [] Music                  [] Guided Imagery [x] Family/friends                 [] Pet visits [] Devotional reading                         [] Unknown 
   [] Other:                                          
 
 
Interventions offered during this visit: (See comments for more details) Patient Interventions: (Pt did not speak but was fully engaged in pastoral conversation alongwith . She would nod and smile at things pertinent to her.) Family/Friend(s): Initial Assessment Plan of Care: 
 
 [] Support spiritual and/or cultural needs  
 [] Support AMD and/or advance care planning process    
 [] Support grieving process 
 [] Coordinate Rites and/or Rituals  
 [] Coordination with community clergy [] No spiritual needs identified at this time 
 [] Detailed Plan of Care below (See Comments)  [] Make referral to Music Therapy 
[] Make referral to Pet Therapy    
[] Make referral to Addiction services 
[] Make referral to [facility] 
[] Make referral to Spiritual Care Partner [] No future visits requested       
[x] Follow up visits as needed Comments:  
Palliative care spiritual assessment was the reason for meeting pt Karina in 3101. Introduced chaplaincy services to patient and family within the medical center. Validated medical journey and provided reflective listening on how both met and story of marriage. Reminded of services within the medical center. Visited By: Marco A Garcia Spiritual Care Services Request  support/spiritual care services via [facility]

## 2020-09-03 NOTE — CONSULTS
Palliative Medicine Consult Jung: [phone]) Patient Name: Solomon Garza YOB: 1955 Date of Initial Consult: 9/2/2020 Reason for Consult: Care Decisions Requesting Provider: Ranulfo Stoll NP 
Primary Care Physician: None SUMMARY:  
Solomon Garza is a 60 y.o. with a past history of HTN, who was admitted on 8/30/2020 from home with a diagnosis of generalized weakness, weight loss and tremors. After admission, she was found to have metastatic cancer and has biopsies pending. Current medical issues leading to Palliative Medicine involvement include: goals of care discussion in the setting of advanced cancer Social:  She lives with her  Sada Mares and their [facility]. She has one daughter who lives in [place] and one grandchild. 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  
1. Frailty 2. DNR Disucssion 3. Advanced Care Planning 4. Goals of Care 5. Debility 6. Somnolence PLAN:  
1. Prior to meeting the patient I did an extensive review of the chart and talked with the oncology team  
2. I met with Ms Óscar Will- It took a bit to get her talking, I am not sure how much of my interaction with her was not wanting to talk, not understanding, or unable to articulate 3. I asked what the oncology team had shared with her about her health, and she replied \"It could be a pulled muscle, or something else. ..... Seanriccardo Domínguez \" 4. She talked more when I asked about her work, and her family, but she seemed to be having trouble with word finding so there were quite long pauses between my question and her answer. She never answered any open ended questions 5. I asked if her  was coming to the hospital today- she said no, he needed to stay home and take care of their dog 6.  I called his number and left a message to try and set up a time when he and I can meet in person as I do not think that she is able to have complex discussions in her current state 7. Initial consult note routed to primary continuity provider and/or primary health care team members 8. Communicated plan of care with: Palliative IDTBecca 192 Team 
 
 GOALS OF CARE / TREATMENT PREFERENCES:  
 
GOALS OF CARE: 
Patient/Health Care Proxy Stated Goals: (TBD) TREATMENT PREFERENCES:  
Code Status: Full Code Advance Care Planning: 
[x] [facility] Interdisciplinary Team has updated the ACP Navigator with [address] and Patient Capacity Primary Decision Maker: Delmar Ramos - [phone] Medical Interventions: Full interventions Other Instructions: Other: As far as possible, the palliative care team has discussed with patient / health care proxy about goals of care / treatment preferences for patient. HISTORY:  
 
History obtained from: chart CHIEF COMPLAINT: None she could verbalize HPI/SUBJECTIVE: The patient is:  
[x] Verbal and participatory [] Non-participatory due to:  
 
limited conversation Clinical Pain Assessment (nonverbal scale for severity on nonverbal patients):  
Clinical Pain Assessment Severity: 0 Duration: for how long has pt been experiencing pain (e.g., 2 days, 1 month, years) Frequency: how often pain is an issue (e.g., several times per day, once every few days, constant) FUNCTIONAL ASSESSMENT:  
 
Palliative Performance Scale (PPS): PPS: 40 PSYCHOSOCIAL/SPIRITUAL SCREENING:  
 
Palliative IDT has assessed this patient for cultural preferences / practices and a referral made as appropriate to needs (Cultural Services, Patient Advocacy, Ethics, etc.) Any spiritual / Zoroastrian concerns: 
[] Yes /  [x] No 
 
Caregiver Burnout: 
[] Yes /  [x] No /  [] No Caregiver Present Anticipatory grief assessment:  
[x] Normal  / [] Maladaptive ESAS Anxiety: Anxiety: 0 
 
ESAS Depression:    
 
 
 REVIEW OF SYSTEMS:  
 
 Positive and pertinent negative findings in ROS are noted above in HPI. The following systems were [x] reviewed / [] unable to be reviewed as noted in HPI Other findings are noted below. Systems: constitutional, ears/nose/mouth/throat, respiratory, gastrointestinal, genitourinary, musculoskeletal, integumentary, neurologic, psychiatric, endocrine. Positive findings noted below. Modified ESAS Completed by: provider Fatigue: 7 Pain: 0 Anxiety: 0 Dyspnea: 0 PHYSICAL EXAM:  
 
From RN flowsheet: 
Wt Readings from Last 3 Encounters:  
09/02/20 230 lb (104.3 kg) Blood pressure (!) 188/99, pulse 94, temperature 97.9 °F (36.6 °C), resp. rate 20, height 5' 9\" (1.753 m), weight 230 lb (104.3 kg), SpO2 99 %, not currently breastfeeding. Pain Scale 1: Visual 
Pain Intensity 1: 5 Pain Onset 1: acute Pain Location 1: Shoulder Pain Orientation 1: Right Pain Description 1: Aching Pain Intervention(s) 1: Rest 
Last bowel movement, if known:  
 
Constitutional: awake, somnolent, slow to respond ENMT: no nasal discharge, moist mucous membranes Cardiovascular: regular rhythm, distal pulses intact Respiratory: breathing not labored, symmetric Skin: warm, dry HISTORY:  
 
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 
 
  DNR (do not resuscitate) discussion () Advanced care planning/counseling discussion () Goals of care, counseling/discussion () Debility () Frailty () Past Medical History:  
Diagnosis Date  Hypertension Past Surgical History:  
Procedure Laterality Date  HX APPENDECTOMY  HX CHOLECYSTECTOMY  HX HYSTERECTOMY  HX TONSIL AND ADENOIDECTOMY Family History Problem Relation Age of Onset  Hypertension Mother  Diabetes Father  No Known Problems Sister  No Known Problems Brother  No Known Problems Other History reviewed, no pertinent family history. Social History Tobacco Use  Smoking status: Never Smoker  Smokeless tobacco: Never Used Substance Use Topics  Alcohol use: Never Frequency: Never Allergies Allergen Reactions  Pcn [Penicillins] Hives Current Facility-Administered Medications Medication Dose Route Frequency  [START ON 9/4/2020] amLODIPine (NORVASC) tablet 10 mg  10 mg Oral DAILY  labetaloL (NORMODYNE;TRANDATE) injection 20 mg  20 mg IntraVENous Q4H PRN  
 cefTRIAXone (ROCEPHIN) 1 g in 0.9% sodium chloride (MBP/ADV) 50 mL  1 g IntraVENous Q24H  
 alcohol 62% (NOZIN) nasal  1 Ampule  1 Ampule Topical Q12H  
 zinc oxide-cod liver oil (DESITIN) 40 % paste   Topical TID  sodium chloride (NS) flush 5-40 mL  5-40 mL IntraVENous Q8H  
 sodium chloride (NS) flush 5-40 mL  5-40 mL IntraVENous PRN  
 acetaminophen (TYLENOL) tablet 650 mg  650 mg Oral Q6H PRN Or  
 acetaminophen (TYLENOL) suppository 650 mg  650 mg Rectal Q6H PRN  polyethylene glycol (MIRALAX) packet 17 g  17 g Oral DAILY PRN  promethazine (PHENERGAN) tablet 12.5 mg  12.5 mg Oral Q6H PRN Or  
 ondansetron (ZOFRAN) injection 4 mg  4 mg IntraVENous Q6H PRN  
 enoxaparin (LOVENOX) injection 40 mg  40 mg SubCUTAneous DAILY  famotidine (PEPCID) tablet 20 mg  20 mg Oral BID  losartan (COZAAR) tablet 100 mg  100 mg Oral DAILY  dexamethasone (DECADRON) 4 mg/mL injection 4 mg  4 mg IntraVENous Q6H  
 
 
 
 LAB AND IMAGING FINDINGS:  
 
Lab Results Component Value Date/Time WBC 10.6 09/01/2020 02:54 AM  
 HGB 13.5 09/01/2020 02:54 AM  
 PLATELET 720 26/26/6758 02:54 AM  
 
Lab Results Component Value Date/Time Sodium 132 (L) 09/03/2020 02:55 AM  
 Potassium 4.0 09/03/2020 02:55 AM  
 Chloride 99 09/03/2020 02:55 AM  
 CO2 32 09/03/2020 02:55 AM  
 BUN 21 (H) 09/03/2020 02:55 AM  
 Creatinine 0.50 (L) 09/03/2020 02:55 AM  
 Calcium 8.9 09/03/2020 02:55 AM  
 Magnesium 2.1 08/31/2020 02:32 AM  
  
Lab Results Component Value Date/Time Alk. phosphatase 97 09/03/2020 02:55 AM  
 Protein, total 6.4 09/03/2020 02:55 AM  
 Albumin 2.8 (L) 09/03/2020 02:55 AM  
 Globulin 3.6 09/03/2020 02:55 AM  
 
Lab Results Component Value Date/Time INR 1.2 (H) 08/31/2020 02:32 AM  
 Prothrombin time 12.0 (H) 08/31/2020 02:32 AM  
  
No results found for: IRON, FE, TIBC, IBCT, PSAT, FERR No results found for: PH, PCO2, PO2 No components found for: [place] Lab Results Component Value Date/Time CK 85 08/31/2020 02:32 AM  
  
 
 
   
 
Total time:  
Counseling / coordination time, spent as noted above:  
> 50% counseling / coordination?:  
 
Prolonged service was provided for  []30 min   []75 min in face to face time in the presence of the patient, spent as noted above. Time Start:  
Time End:  
Note: this can only be billed with 53505 (initial) or 98407 (follow up). If multiple start / stop times, list each separately.

## 2020-09-03 NOTE — PROGRESS NOTES
Hospitalist Progress Note NAME: Derek Willis :  1955 MRN:  [id number] Assessment / Plan: 
 
50-year-old female complaining of a few months of generalized weakness, frequent falls, extremity weakness especially in the legs, weight loss, confusion and forgetfulness. On exam Mild tremor.  4/5 strength bilateral lower extremities. Of note she just had a recent head CT  Head on   which was normal, just showed microvascular changes.  Her H&P is not at all suggestive of acute CVA, no indication for CT angiography at this time. Labs reassuring Except k 3.3 . Per patient she also lost almost 30 pounds of weight in last couple of month because she did not feel like eating. Patient has history of gastric sleeve for weight loss 4 years ago. Patient denied to be depressed. 
  
  
Generalized weaknesss more in Bilateral leg weakness( intermittent) poa Weight loss poa Mild tremors Concern for metastatic lung ca to brain , thoracic spine Nonfocal exam. Progressive symptoms for months. CT scan a couple weeks ago nonspecific. MRI c spine No acute abnormality. 2. Mild spinal stenosis at C4-5. Neural foraminal narrowing as above. RI cervical spine : 
 
 MRI  Lumbar spine showed : 
1. Several scattered osseous metastases. 2. Multilevel spondylosis as above MRI brain : 
1. Extensive leptomeningeal enhancement as well as several subtle areas of 
parenchymal enhancement, highly suspicious for metastatic disease given findings 
on chest CT earlier today suspicious for bronchogenic carcinoma. 2. Extensive chronic white matter disease and areas of remote infarction, with 
no acute intracranial hemorrhage or infarction. - 
- tsh wnl hiv b12 folate wnl   rpr non reactive  ua utox negative    cpk wnl   a1c 5.3 FLP 
-We will check zinc and selenium as patient has history of gastric sleeve operation to rule out deficiency 
-neuro consulted 
-pt/ot consulted Going for LN biopsy Ct scan chest : 
IMPRESSION: Advanced lung carcinoma: 
1. Large left upper lobe lung carcinoma. 2. Numerous jany metastases, including contralateral mediastinal and 
ipsilateral supraclavicular. 3. Diffuse, bilateral, pulmonary metastases. 4. Scattered osseous metastases. 5. Few hepatic metastases. CT chest : 
 
MRI thoracic spine : 
. Multiple small osseous metastases scattered throughout the thoracic spine, 
which are intermixed among multiple hemangiomas within the thoracic spine. 2. No definite evidence of leptomeningeal metastatic disease in the thoracic 
spine, though with evaluation significantly limited by motion. 3. Redemonstrated left upper lobe mass, metastatic mediastinal and hilar 
lymphadenopathy, and innumerable pulmonary nodule metastases. 4. No significant spinal canal stenosis at any level. No definite cord signal 
abnormality, with evaluation significantly limited by motion. 
  
S/p LN biopsy Left supraclavicular lymphadenopathy on 09/02 Awaiting path  
  
UTI Started on ceftriaxone Hypokalemia- repleted  Monitor   Mag wnl . 
  
Uncontrolled HTN-172/91  Resume cozaar , start norvasc  And give iv hydralazine prn 
+ prn labetolol  
  
  
Code Status: full Surrogate Decision Maker: Margret Sanchez  Spouse  [phone]   
  
  
  
DVT Prophylaxis: sq lovenox GI Prophylaxis: not indicated 
  
Baseline: independent 30.0 - 39.9 Obese / Body mass index is 33.97 kg/m². Subjective: Chief Complaint / Reason for Physician Visit FU leg weakness . no acute issues still weaker on RLE Review of Systems: 
Symptom Y/N Comments  Symptom Y/N Comments Fever/Chills n   Chest Pain n   
Poor Appetite    Edema Cough n   Abdominal Pain n   
Sputum n   Joint Pain SOB/MEZA    Pruritis/Rash Nausea/vomit n   Tolerating PT/OT Diarrhea    Tolerating Diet y Constipation    Other Could NOT obtain due to:   
 
Objective: VITALS:  
 Last 24hrs VS reviewed since prior progress note. Most recent are: 
Patient Vitals for the past 24 hrs: 
 Temp Pulse Resp BP SpO2  
09/03/20 0641 97.7 °F (36.5 °C) 89 18 168/84 99 % 09/03/20 0316 97.9 °F (36.6 °C) 81 16 142/79 99 % 09/02/20 2237 98.1 °F (36.7 °C) 83 20 142/76 98 % 09/02/20 2132 97.8 °F (36.6 °C) (!) 58 20 162/73 97 % 09/02/20 2035 97.9 °F (36.6 °C) 86 20 149/76 99 % 09/02/20 1930 97.8 °F (36.6 °C) 89 18 (!) 117/98 98 % 09/02/20 1839 97.7 °F (36.5 °C) 77 16 155/78 98 % 09/02/20 1814  82 14 146/68 99 % 09/02/20 1800  87 18 161/71 100 % 09/02/20 1745  78 14 151/71 100 % 09/02/20 1730  79 20 163/70 100 % 09/02/20 1710  83 16 165/78 100 % 09/02/20 1705  84 16 145/69 100 % 09/02/20 1700  85 16 147/69 100 % 09/02/20 1656  88 16  100 % 09/02/20 1655  91 13 148/69 100 % 09/02/20 1652 98.4 °F (36.9 °C) 88 16 155/69 100 % 09/02/20 1650  89 15 155/69 100 % 09/02/20 1647 98.4 °F (36.9 °C) 85 15 139/70   
09/02/20 1500 98.4 °F (36.9 °C) 87 22 (!) 172/94 98 % 09/02/20 1144  74  167/71   
09/02/20 1042 98.5 °F (36.9 °C) 85 18 (!) 182/93 97 % Intake/Output Summary (Last 24 hours) at 9/3/2020 0825 Last data filed at 9/3/2020 4720 Gross per 24 hour Intake 500 ml Output 1160 ml Net -660 ml PHYSICAL EXAM: 
General: WD, WN. Alert, cooperative, no acute distress   
EENT:  EOMI. Anicteric sclerae. MMM Resp:  CTA bilaterally, no wheezing or rales. No accessory muscle use CV:  Regular  rhythm,  No edema GI:  Soft, Non distended, Non tender.  +Bowel sounds Neurologic:  Alert and oriented X 3, normal speech, Psych:   Poor insight Skin:  No rashes. No jaundice Reviewed most current lab test results and cultures  YES Reviewed most current radiology test results   YES Review and summation of old records today    NO Reviewed patient's current orders and MAR    YES 
PMH/SH reviewed - no change compared to H&P 
 ________________________________________________________________________ Care Plan discussed with: 
  Comments Patient x Family RN x Care Manager Consultant Multidiciplinary team rounds were held today with , nursing, pharmacist and clinical coordinator. Patient's plan of care was discussed; medications were reviewed and discharge planning was addressed. ________________________________________________________________________ Total NON critical care TIME: 35   Minutes Total CRITICAL CARE TIME Spent:   Minutes non procedure based Comments >50% of visit spent in counseling and coordination of care x   
________________________________________________________________________ Ivania Knowles MD  
 
Procedures: see electronic medical records for all procedures/Xrays and details which were not copied into this note but were reviewed prior to creation of Plan. LABS: 
I reviewed today's most current labs and imaging studies. Pertinent labs include: 
Recent Labs  
  09/01/20 
0254 WBC 10.6 HGB 13.5 HCT 42.0  Recent Labs  
  09/03/20 
0255 09/01/20 
0254 * 132* K 4.0 3.3*  
CL 99 98  
CO2 32 29 * 132* BUN 21* 19  
CREA 0.50* 0.51* CA 8.9 9.1 ALB 2.8* 2.9* TBILI 0.5 0.9 ALT 44 58 Signed: Ivania Knowles MD

## 2020-09-03 NOTE — PROGRESS NOTES
BONI- 
 
CM review chart. Patient had multiple visits from providers. Wound care saw patient states per note  The gluteal cleft has some redness to the skin possibility it is caused by moisture and the skin is scabbing slightly. The skin is currently blanchable all around . Pulmonary associates saw patient recommending least invasive biopsy procedure that will give an adequate diagnosis. CM will try to contact patient spouse for discharge planning. [address] [place] [address] 
[phone]

## 2020-09-03 NOTE — PROGRESS NOTES
Problem: Self Care Deficits Care Plan (Adult) Goal: *Acute Goals and Plan of Care (Insert Text) Description:  
FUNCTIONAL STATUS PRIOR TO ADMISSION: Patient was modified independent using a single point cane for functional mobility. HOME SUPPORT: The patient lived with  and needed assist for transfers and per chart has had a slow decline and has had numerous falls PTA. Occupational Therapy Goals Initiated 9/3/2020 1. Patient will perform grooming sitting on EOB, in midline with supervision/set-up within 7 day(s). 2.  Patient will perform bathing UB sitting on EOB with moderate assistance  within 7 day(s). 3.  Patient will perform upper body dressing with minimal assistance/contact guard assist within 7 day(s). 4.  Patient will perform toilet transfers with moderate assistance  within 7 day(s). 5.  Patient will perform all aspects of toileting with minimal assistance/contact guard assist within 7 day(s). 6.  Patient will participate in upper extremity therapeutic exercise/activities with supervision/set-up for 5 minutes within 7 day(s). 7.  Patient will utilize energy conservation techniques during functional activities with verbal and tactile cues within 7 day(s). Outcome: Not Met OCCUPATIONAL THERAPY EVALUATION Patient: Derek Willis (62 y.o. female) Date: 9/3/2020 Primary Diagnosis: Weakness [R53.1] Tremor [R25.1] Confusion [R41.0] Hypokalemia [E87.6] Procedure(s) (LRB): LEFT SUPERCLAVICULAR  LYMPH NODE BIOPSY (Left) 1 Day Post-Op Precautions: fall ASSESSMENT Based on the objective data described below, the patient presents with decreased endurance, strength, functional mobility, ADLs and decreased cognition. Pt was living at home withfamily and stated that she has been declining since June of 2020 and has had numerous fall. Pt was needing assist with  ADLs due to weakness.   Pt was mod of 2 for bed mobility , and was max of 2 for scooting to EOB. She was leaning to right and had tactile cues to assist her and she continued to need the cues to stay at midline. Pt was very nervous about falling. She was mod of 2 to stand and continued to lean to right. Worked with pt on weight shifting and she was able to side step close to [facility] with mod of 2. She was mod of 2 for sit to supine. Pt was left in bed eating her fruit and call bell within reach. Pt has functional range in BUe and her strength is functional  
 
Current Level of Function Impacting Discharge (ADLs/self-care): decreased endurance, strength, functional mobility, ADls Functional Outcome Measure: The patient scored 35/100 on the Barthel outcome measure which is indicative of max for ADLs . Other factors to consider for discharge: pt will need rehab at discharge Patient will benefit from skilled therapy intervention to address the above noted impairments. PLAN : 
Recommendations and Planned Interventions: self care training, functional mobility training, therapeutic exercise, balance training, therapeutic activities, endurance activities, patient education and home safety training Frequency/Duration: Patient will be followed by occupational therapy 4 times a week to address goals. Recommendation for discharge: (in order for the patient to meet his/her long term goals) Therapy 3 hours per day 5-7 days per week This discharge recommendation: 
Has been made in collaboration with the attending provider and/or case management IF patient discharges home will need the following DME: tbd SUBJECTIVE:  
Patient stated I dont want to fall. If I do you all will have to pick me up. Donna Perez OBJECTIVE DATA SUMMARY:  
HISTORY:  
Past Medical History:  
Diagnosis Date  Hypertension Past Surgical History:  
Procedure Laterality Date  HX APPENDECTOMY  HX CHOLECYSTECTOMY  HX HYSTERECTOMY  HX TONSIL AND ADENOIDECTOMY Expanded or extensive additional review of patient history:  
 
Home Situation Home Environment: Private residence # Steps to Enter: 3 Rails to Enter: Yes Hand Rails : Left One/Two Story Residence: Two story, live on 1st floor Living Alone: No 
Support Systems: Spouse/Significant Other/Partner Patient Expects to be Discharged to[de-identified] Private residence Current DME Used/Available at Home: None Tub or Shower Type: Tub/Shower combination Hand dominance: Right EXAMINATION OF PERFORMANCE DEFICITS: 
Cognitive/Behavioral Status: 
Neurologic State: Alert;Confused Orientation Level: Oriented to person;Oriented to place Cognition: Follows commands; Impaired decision making;Memory loss Perception: Cues to attend to right side of body Perseveration: No perseveration noted Safety/Judgement: Fall prevention Skin: in fair health Edema: none Hearing: Auditory Auditory Impairment: None Vision/Perceptual:   
    
    
    
  
   Intact Range of Motion: 
 
AROM: Generally decreased, functional 
PROM: Generally decreased, functional 
  
  
  
  
  
  
 
Strength: 
 
Strength: Generally decreased, functional 
  
  
  
  
 
Coordination: 
Coordination: Within functional limits Fine Motor Skills-Upper: Left Intact; Right Intact Gross Motor Skills-Upper: Left Intact; Right Intact Tone & Sensation: 
 
Tone: Normal 
Sensation: Intact Balance: 
Sitting: Impaired(R lean. Manual and verbal cues to shift to left) Sitting - Static: Poor (constant support) Sitting - Dynamic: Poor (constant support) Standing: Impaired; With support Standing - Static: Poor Standing - Dynamic : Poor Functional Mobility and Transfers for ADLs: 
Bed Mobility: 
Rolling: Minimum assistance Supine to Sit: Minimum assistance; Moderate assistance;Assist x2 Sit to Supine: Minimum assistance; Moderate assistance;Assist x2 Scooting: Maximum assistance Transfers: Sit to Stand: Minimum assistance;Assist x2 Stand to Sit: Minimum assistance ADL Assessment: 
Feeding: Setup Oral Facial Hygiene/Grooming: Setup Bathing: Maximum assistance;Minimum assistance Upper Body Dressing: Minimum assistance;Setup Lower Body Dressing: Maximum assistance;Minimum assistance Toileting: Maximum assistance;Minimum assistance Cognitive Retraining Safety/Judgement: Fall prevention Functional Measure: 
Barthel Index: 
 
Bathin Bladder: 5 Bowels: 10 
Groomin Dressin Feedin Mobility: 0 Stairs: 0 Toilet Use: 5 Transfer (Bed to Chair and Back): 5 Total: 35/100 The Barthel ADL Index: Guidelines 1. The index should be used as a record of what a patient does, not as a record of what a patient could do. 2. The main aim is to establish degree of independence from any help, physical or verbal, however minor and for whatever reason. 3. The need for supervision renders the patient not independent. 4. A patient's performance should be established using the best available evidence. Asking the patient, friends/relatives and nurses are the usual sources, but direct observation and common sense are also important. However direct testing is not needed. 5. Usually the patient's performance over the preceding 24-48 hours is important, but occasionally longer periods will be relevant. 6. Middle categories imply that the patient supplies over 50 per cent of the effort. 7. Use of aids to be independent is allowed. Jonatan Merrill., Barthel, D.W. ([id number]). Functional evaluation: the Barthel Index. [address] [facility] (14)2. SAUL Rodney, Lucas Eastman., [facility] Malcolm., Ugo Méndezger, [address] [place] [address] (). Measuring the change indisability after inpatient rehabilitation; comparison of the responsiveness of the Barthel Index and Functional Bailey Measure. Journal of Neurology, Neurosurgery, and Psychiatry, 66(4), 033-937. MARY JO Chambers, LUZ Khalil, & Nusrat Magana M.A. (2004.) Assessment of post-stroke quality of life in cost-effectiveness studies: The usefulness of the Barthel Index and the EuroQoL-5D. [facility], 13, 833-02 Occupational Therapy Evaluation Charge Determination History Examination Decision-Making MEDIUM Complexity : Expanded review of history including physical, cognitive and psychosocial  history  MEDIUM Complexity : 3-5 performance deficits relating to physical, cognitive , or psychosocial skils that result in activity limitations and / or participation restrictions MEDIUM Complexity : Patient may present with comorbidities that affect occupational performnce. Miniml to moderate modification of tasks or assistance (eg, physical or verbal ) with assesment(s) is necessary to enable patient to complete evaluation Based on the above components, the patient evaluation is determined to be of the following complexity level: MEDIUM Activity Tolerance:  
Poor Please refer to the flowsheet for vital signs taken during this treatment. After treatment patient left in no apparent distress:   
Supine in bed and Call bell within reach COMMUNICATION/EDUCATION:  
The patients plan of care was discussed with: Physical therapist and Registered nurse. Home safety education was provided and the patient/caregiver indicated understanding. and Patient understands intent and goals of therapy, but is neutral about his/her participation. This patients plan of care is appropriate for delegation to [facility] [place]. Thank you for this referral. 
Quentin Aldana OT Time Calculation: 30 mins

## 2020-09-03 NOTE — OP NOTES
[address] 
OPERATIVE REPORT Name:  Nolan Parsons 
MR#:  [id number] :  1955 ACCOUNT #:  [de-identified] DATE OF SERVICE:  2020 PREOPERATIVE DIAGNOSIS:  Left supraclavicular lymphadenopathy. POSTOPERATIVE DIAGNOSIS:  Left supraclavicular lymphadenopathy. PROCEDURE PERFORMED:  Excisional biopsy of the left supraclavicular lymph node. SURGEON:  Manohar Maher MD 
 
ASSISTANT:  Staff. ANESTHESIA:  General. 
 
COMPLICATIONS:  None immediate. SPECIMENS REMOVED:  Left supraclavicular lymph node. IMPLANTS:  None. DRAINS:  None. ESTIMATED BLOOD LOSS:  Minimal. 
 
FINDINGS:  There was a larger 3 cm firm lymph node deep to the sternoclavicular joint. There was also adjacent lymphadenopathy. DESCRIPTION OF PROCEDURE:  After obtaining informed consent, the patient was taken to the operating room and placed supine on the operating table. An operative time-out was performed and general endotracheal anesthesia was induced. Preoperative antibiotics were administered and the left neck was prepped and draped in the usual sterile fashion. Ultrasound had been used prior to prepping and an incision was made over the mass that had been localized with ultrasound. Dissection was carried down beneath the sternocleidomastoid and the lymph node was encountered. This was dissected out using a tonsil clamp and electrocautery. As we got deeper, we divided lymphatic and vascular vessels between clips. Eventually, the entire node was enucleated and was passed off for permanent pathology. The operative field was checked for hemostasis and excellent hemostasis was achieved with minimal electrocautery. A tiny piece of Surgicel was left over the vascular pedicle and the fascia was closed using interrupted 3-0 Vicryl sutures.   The deep dermal tissues were likewise reapproaximated and the skin was reapproximated using a running 4-0 Monocryl in the subcuticular layer. The incision was dressed with Dermabond and the patient was recovered from anesthesia and taken to the recovery area in satisfactory condition. All instrument, sponge and needle counts were reported as correct. Juan Miguel Christine MD DD/[email or web address] 
D:  09/02/2020 16:44 
T:  09/02/2020 22:50 
JOB #:  [id number] CC:  Cary Cheney MD

## 2020-09-03 NOTE — PROGRESS NOTES
Bedside, Verbal and Written shift change report given to YANIRA Gonzalez  (oncoming nurse) by [facility], RN (offgoing nurse).  Report included the following information SBAR, Kardex, MAR and Recent Results.

## 2020-09-03 NOTE — PROGRESS NOTES
Consult REFERRED BY: 
None CHIEF COMPLAINT: Weakness in the legs Subjective:  
 
Willie Perez is a 60 y.o. right-handed  female seen at the request of Dr. Kiera Marie for evaluation of new problem of increasing difficulty with leg weakness, right greater than left but not associated with any significant back pain or neck pain, but with some pain radiating into her arms. Is been going on for several months, and she is had a 30 pound weight loss in addition. She had an EMG study of the right lower extremity by orthopedics 2 weeks ago that was unremarkable. She was found to have a large lung cancer on her x-ray today, with metastatic disease throughout the lung and lymph nodes, and may be to the liver, and her MRI scan of the brain shows leptomeningeal and intracranial lesions all consistent with her metastatic lung cancer. Cervical MRI and lumbar MRIs show some degenerative arthritis, but no significant structural brain lesions and no significant leptomeningeal enhancement. Patient denies any headache, or fever or meningismus or difficulty with any of her cranial nerves. Her laboratory studies are relatively unremarkable except for low potassium level. Her bowel and bladder function remained stable. Patient more awake today, still a little encephalopathic and slow mentally. Waiting for the path diagnosis to decide on what therapy needs to be given. Patient has minimal headache. Her neuro exam is stable. Past Medical History:  
Diagnosis Date  Hypertension Past Surgical History:  
Procedure Laterality Date  HX APPENDECTOMY  HX CHOLECYSTECTOMY  HX HYSTERECTOMY  HX TONSIL AND ADENOIDECTOMY Family History Problem Relation Age of Onset  Hypertension Mother  Diabetes Father  No Known Problems Sister  No Known Problems Brother  No Known Problems Other Social History Tobacco Use  Smoking status: Never Smoker  Smokeless tobacco: Never Used Substance Use Topics  Alcohol use: Never Frequency: Never Current Facility-Administered Medications:  
  cefTRIAXone (ROCEPHIN) 1 g in 0.9% sodium chloride (MBP/ADV) 50 mL, 1 g, IntraVENous, Q24H, Alea Magana MD, Last Rate: 100 mL/hr at 09/02/20 1152, 1 g at 09/02/20 1152   alcohol 62% (NOZIN) nasal  1 Ampule, 1 Ampule, Topical, Q12H, Kenzie Bermudez MD 
  zinc oxide-cod liver oil (DESITIN) 40 % paste, , Topical, TID, Alea Magana MD, Stopped at 09/02/20 1600 
  sodium chloride (NS) flush 5-40 mL, 5-40 mL, IntraVENous, Q8H, Issac Alonzo MD, 10 mL at 09/02/20 1441   sodium chloride (NS) flush 5-40 mL, 5-40 mL, IntraVENous, PRN, Din, Deliliah Moritz, MD, 10 mL at 09/02/20 1946   acetaminophen (TYLENOL) tablet 650 mg, 650 mg, Oral, Q6H PRN, 650 mg at 09/02/20 1150 **OR** acetaminophen (TYLENOL) suppository 650 mg, 650 mg, Rectal, Q6H PRN, Din, Deliliah Moritz, MD 
  polyethylene glycol (MIRALAX) packet 17 g, 17 g, Oral, DAILY PRN, Clinton, Deliliah Moritz, MD 
  promethazine (PHENERGAN) tablet 12.5 mg, 12.5 mg, Oral, Q6H PRN **OR** ondansetron (ZOFRAN) injection 4 mg, 4 mg, IntraVENous, Q6H PRN, Issac Alonzo MD 
  enoxaparin (LOVENOX) injection 40 mg, 40 mg, SubCUTAneous, DAILY, Din, Deliliah Moritz, MD, Stopped at 09/02/20 0900   famotidine (PEPCID) tablet 20 mg, 20 mg, Oral, BID, Din, Deliliah Moritz, MD, Stopped at 09/02/20 1800 
  losartan (COZAAR) tablet 100 mg, 100 mg, Oral, DAILY, Alea Magana MD, 100 mg at 09/02/20 0941 
  dexamethasone (DECADRON) 4 mg/mL injection 4 mg, 4 mg, IntraVENous, Q6H, Milla Robledo MD, 4 mg at 09/02/20 1946 Allergies Allergen Reactions  Pcn [Penicillins] Hives MRI Results (most recent): 
Results from Hospital Encounter encounter on 08/30/20 MRI LUMB SPINE W WO CONT Narrative EXAM: MRI LUMB SPINE W WO CONT INDICATION: generalized weakness more in extremities for few months  but 
worsening. Lung cancer COMPARISON: None TECHNIQUE: MR imaging of the lumbar spine was performed using the following 
sequences: sagittal T1, T2, STIR;  axial T1, T2 prior to and following contrast 
administration. CONTRAST: 20 mL of Dotarem. FINDINGS: 
 
There is normal alignment of the lumbar spine. Vertebral body heights are 
maintained. Chronic degenerative fatty change is seen at the L4-5 endplates. No 
fracture. There is a 1.7 cm lesion in the right side of L3 with mildly decreased T1 and T2 signal. There is a subtle ring of enhancement. There are several 
scattered similar lesions in the sacrum. There is a 9 mm lesion in the posterior 
right iliac bone. There are several scattered incidental intraosseous lipomas 
both increased T1 and T2 signal. The most prominent one is seen in T11. The conus medullaris terminates at T12. Signal and caliber of the distal spinal 
cord are within normal limits. There is no pathologic intrathecal enhancement. The paraspinal soft tissues are within normal limits. Lower thoracic spine: No herniation or stenosis. L1-L2: No herniation or stenosis. L2-L3: Mild disc space narrowing. Mild broad-based disc bulge that is slightly 
asymmetric towards the left. There is mild spinal stenosis with greater 
impingement left lateral recess. There is mild left neural foraminal narrowing. L3-L4: Mild disc space narrowing. Mild broad-based disc bulge causing mild 
spinal stenosis. No significant neural foraminal narrowing. L4-L5: Severe disc space narrowing. Minimal broad-based disc osteophyte complex 
causing minimal spinal stenosis. There is mild left and moderate right neural 
foraminal narrowing. L5-S1: No herniation or stenosis. Mild bilateral posterior facet arthropathy. Impression IMPRESSION: 
 
1. Several scattered osseous metastases. 2. Multilevel spondylosis as above. [facility]20 MRI LUMB SPINE W WO CONT Narrative EXAM: MRI LUMB SPINE W WO CONT INDICATION: generalized weakness more in extremities for few months  but 
worsening. Lung cancer COMPARISON: None TECHNIQUE: MR imaging of the lumbar spine was performed using the following 
sequences: sagittal T1, T2, STIR;  axial T1, T2 prior to and following contrast 
administration. CONTRAST: 20 mL of Dotarem. FINDINGS: 
 
There is normal alignment of the lumbar spine. Vertebral body heights are 
maintained. Chronic degenerative fatty change is seen at the L4-5 endplates. No 
fracture. There is a 1.7 cm lesion in the right side of L3 with mildly decreased T1 and T2 signal. There is a subtle ring of enhancement. There are several 
scattered similar lesions in the sacrum. There is a 9 mm lesion in the posterior 
right iliac bone. There are several scattered incidental intraosseous lipomas 
both increased T1 and T2 signal. The most prominent one is seen in T11. The conus medullaris terminates at T12. Signal and caliber of the distal spinal 
cord are within normal limits. There is no pathologic intrathecal enhancement. The paraspinal soft tissues are within normal limits. Lower thoracic spine: No herniation or stenosis. L1-L2: No herniation or stenosis. L2-L3: Mild disc space narrowing. Mild broad-based disc bulge that is slightly 
asymmetric towards the left. There is mild spinal stenosis with greater 
impingement left lateral recess. There is mild left neural foraminal narrowing. L3-L4: Mild disc space narrowing. Mild broad-based disc bulge causing mild 
spinal stenosis. No significant neural foraminal narrowing. L4-L5: Severe disc space narrowing. Minimal broad-based disc osteophyte complex 
causing minimal spinal stenosis. There is mild left and moderate right neural 
foraminal narrowing. L5-S1: No herniation or stenosis. Mild bilateral posterior facet arthropathy.  
   
 Impression IMPRESSION: 
 
 1. Several scattered osseous metastases. 2. Multilevel spondylosis as above. Review of Systems: A comprehensive review of systems was negative except for: Constitutional: positive for fatigue, malaise and weight loss Musculoskeletal: positive for myalgias, arthralgias, stiff joints and muscle weakness Neurological: positive for coordination problems, gait problems and weakness Vitals:  
 09/02/20 1814 09/02/20 1839 09/02/20 1930 09/02/20 2035 BP: 146/68 155/78 (!) 117/98 149/76 Pulse: 82 77 89 86 Resp: 14 16 18 20 Temp:  97.7 °F (36.5 °C) 97.8 °F (36.6 °C) 97.9 °F (36.6 °C) SpO2: 99% 98% 98% 99% Weight:      
Height:      
 
Objective: I 
 
 
NEUROLOGICAL EXAM: 
 
Appearance: The patient is well developed, well nourished, provides a poor history and is in no acute distress. Mental Status: Oriented to time, place and person, and the president, cognitive function is a bit slow and mildly abnormal and speech is fluent and no aphasia or dysarthria. Mood and affect appropriate but very anxious and mildly encephalopathic. Cranial Nerves:   Intact visual fields. Fundi are benign, disc are flat, no lesions seen on funduscopy. YESENIA, EOM's full, no nystagmus, no ptosis. Facial sensation is normal. Corneal reflexes are not tested. Facial movement is symmetric. Hearing is normal bilaterally. Palate is midline with normal sternocleidomastoid and trapezius muscles are normal. Tongue is midline. Neck without meningismus or bruits Temporal arteries are not tender or enlarged TMJ areas are not tender on palpation Motor:  5/5 strength in upper proximal and distal muscles, but strength in the right leg is about 3/5 and in the left leg about 4/5. Normal bulk and tone. No fasciculations. Rapid alternating movement is intact on the left and a bit slow on the right Reflexes:   Deep tendon reflexes 2+/4 on the left and and 3+/4 on the right. No babinski or clonus present Sensory:   Normal to touch, pinprick and vibration and temperature. DSS is intact Gait:  Not tested. Tremor:   No tremor noted. Cerebellar:  Not tested cerebellar signs on Romberg and tandem testing and finger-nose-finger exam.  
Neurovascular:  Normal heart sounds and regular rhythm, peripheral pulses intact, and no carotid bruits. Assessment: ICD-10-CM ICD-9-CM 1. Bilateral leg weakness  R29.898 729.89   
2. Weight loss  R63.4 783.21   
3. Fall in home, initial encounter  W19. Dana Miller I093.2 Y92.009 E849.0 4. Acute alteration in mental status  R41.82 780.97   
5. Carcinoma of cerebral meninges (HCC)  C70.0 192.1 6. Confusion  R41.0 298.9 7. Convulsive syncope  R55 780.2   
  780.39   
8. Lung cancer metastatic to brain ([facility] [address]  C34.90 162.9   
 C79.31 198.3 9. Paraplegic gait  R26.1 781.2 10. Bilateral carotid artery stenosis  I65.23 433.10   
  433.30 11. Cerebral microvascular disease  I67.9 437.9 12. Lymphadenopathy  R59.1 785. 6 Active Problems: Hypokalemia (8/31/2020) Confusion (8/31/2020) Tremor (8/31/2020) Weakness (8/31/2020) Metastatic lung cancer (metastasis from lung to other site) [facility]) (8/31/2020) Carcinoma of cerebral meninges ([facility] [address] (8/31/2020) Paraplegic gait (8/31/2020) Acute alteration in mental status (8/31/2020) Convulsive syncope (8/31/2020) Plan:  
 
Patient stable, minimal headache, no neuro changes, and oncology is awaiting results to decide on therapy. Patient on Decadron 4 times daily 4 mg. Patient has leptomeningeal and intracranial metastatic disease, most likely causing her symptoms, and the right leg is probably more involved than the left because of a left pontine lesion. We will continue steroids, and she will need a biopsy and radiation therapy consults once we have a tissue diagnosis MRI of the thoracic spine was done today, and did show bony metastases but no clear leptomeningeal or compressive lesions. Discussed with patient and her  in detail explained her diagnosis prognosis of the sphenoid for now. Tragic case Signed By: Leonid Avila MD   
 September 2, 2020 CC: None FAX: None 
9 AM

## 2020-09-03 NOTE — PROGRESS NOTES
Speech pathology Attempted to see patient for possible integrated language assessment; however, patient GABRIELA. Will follow up later.  Michelle Hancock M.S. [place]-SLP

## 2020-09-03 NOTE — ROUTINE PROCESS
Bedside and Verbal shift change report given to Ana (oncoming nurse) by Lili Sacks (offgoing nurse). Report included the following information SBAR, Kardex, Intake/Output and MAR. Zone Phone:   [phone] Significant changes during shift:  none Patient Information Solomon Garza 60 y.o. 
8/30/2020  9:13 PM by Candice Rodriguez MD. Solomon Garza was admitted from Home 
 
Problem List 
 
Patient Active Problem List  
 Diagnosis Date Noted  Somnolence  DNR (do not resuscitate) discussion  Advanced care planning/counseling discussion  Goals of care, counseling/discussion  Debility  Frailty  Hypokalemia 08/31/2020  Confusion 08/31/2020  Tremor 08/31/2020  Weakness 08/31/2020  Metastatic lung cancer (metastasis from lung to other site) [facility]) 08/31/2020  Carcinoma of cerebral meninges ([facility] [address] 08/31/2020  Paraplegic gait 08/31/2020  Acute alteration in mental status 08/31/2020  Convulsive syncope 08/31/2020 Past Medical History:  
Diagnosis Date  Hypertension Core Measures: CVA: Yes Yes Activity Status: OOB to Chair No 
Ambulated this shift No  
Bed Rest Yes DVT prophylaxis: DVT prophylaxis Med- Yes DVT prophylaxis SCD or NORM- No  
 
Wounds: (If Applicable) Wounds- Yes Location sacrum Patient Safety: 
 
Falls Score Total Score: 4 Safety Level_______ Bed Alarm On? Yes Sitter? No 
 
Plan for upcoming shift: safety Discharge Plan: No  
 
Active Consults: 
IP CONSULT TO HOSPITALIST 
IP CONSULT TO NEUROLOGY 
IP CONSULT TO HEMATOLOGY 
IP CONSULT TO PALLIATIVE CARE - PROVIDER 
IP CONSULT TO PULMONOLOGY 
IP CONSULT TO GENERAL SURGERY

## 2020-09-03 NOTE — PROGRESS NOTES
Speech pathology Attempted to see patient for integrated language assessment. Patient sleeping on arrival with  present.  confirmed it was ok for me to wake patient. Patient's eyes opened to sternal rub but she just stared at me without attempting to speak. I introduced myself and asked if she was up for going through some thinking/memory questions. Without vocalizing, patient shook her head no.  reports she hasn't been sleeping well at night and it may be best to follow up tomorrow. Will follow as appropriate tomorrow vs next week. Thanks,  Rodrigo Gao M.S. CCC-SLP

## 2020-09-03 NOTE — PROGRESS NOTES
Problem: Mobility Impaired (Adult and Pediatric) Goal: *Acute Goals and Plan of Care (Insert Text) Description: FUNCTIONAL STATUS PRIOR TO ADMISSION: Pt lives with  on first floor of home with 3 step entry. Prior to recent decline, pt had been able to amb w/o device on her own. Her  reports a rapid decline with multiple (many) falls. HOME SUPPORT PRIOR TO ADMISSION: The patient lived with  but did not require assist. 
 
Physical Therapy Goals Initiated 9/1/2020 1. Patient will move from supine to sit and sit to supine , scoot up and down, and roll side to side in bed with supervision/set-up within 7 day(s). 2.  Patient will transfer from bed to chair and chair to bed with minimal assistance/contact guard assist using the least restrictive device within 7 day(s). 3.  Patient will perform sit to stand with minimal assistance/contact guard assist within 7 day(s). 4.  Patient will ambulate with minimal assistance/contact guard assist for 50 feet with the least restrictive device within 7 day(s). 5.  Patient will ascend/descend 3 stairs with handrail(s) with minimal assistance/contact guard assist within 7 day(s). Outcome: Progressing Towards Goal 
 PHYSICAL THERAPY TREATMENT Patient: Nikhil Hernández (31 y.o. female) Date: 9/3/2020 Diagnosis: Weakness [R53.1] Tremor [R25.1] Confusion [R41.0] Hypokalemia [E87.6] <principal problem not specified> Procedure(s) (LRB): LEFT SUPERCLAVICULAR  LYMPH NODE BIOPSY (Left) 1 Day Post-Op Precautions:   
Chart, physical therapy assessment, plan of care and goals were reviewed. ASSESSMENT Patient continues with skilled PT services and is progressing towards goals. Ms. Hector Man tolerated todays session well. Performed bed mobility at min-mod A x 2. Scooting required max A although pt did attempt to self initiate movement. Pt stood for 2 trials at min A x2 with HHA.  Unable to demonstrate forward progression with walker but able to side step x 2 ft with HHA x 2. Mod A x 2 mostly for weight shifting as pt with rightward lean and difficulty offloading L LE. Responds well to manual facilitation of weight shift. She will continue to benefit from skilled PT services to promote improved independence with mobility. Based on todays presentation would recommend inpatient rehab to maximize functional gains and return to PLOF however depending on patient progress and medical status this may change. Do not feel she will be safe to return home with single support of  at this time. Pt refused use of walker this visit but feel she would benefit. Pt agreeable next session to trial.  
 
Current Level of Function Impacting Discharge (mobility/balance): Assist of 2 for mobility, some degree of cognitive impairments Other factors to consider for discharge: Steps to enter PLAN : 
Patient continues to benefit from skilled intervention to address the above impairments. Continue treatment per established plan of care. to address goals. Recommendation for discharge: (in order for the patient to meet his/her long term goals) Therapy 3 hours per day 5-7 days per week This discharge recommendation: 
Has been made in collaboration with the attending provider and/or case management IF patient discharges home will need the following DME: to be determined (TBD) SUBJECTIVE:  
Patient stated I want to be anywhere but here.  OBJECTIVE DATA SUMMARY:  
Critical Behavior: 
Neurologic State: Drowsy Orientation Level: Oriented to person, Oriented to place Cognition: Follows commands Functional Mobility Training: 
Bed Mobility: 
Rolling: Minimum assistance Supine to Sit: Minimum assistance; Moderate assistance;Assist x2 Sit to Supine: Minimum assistance; Moderate assistance;Assist x2 Scooting: Maximum assistance Transfers: 
Sit to Stand: Minimum assistance;Assist x2 
 Stand to Sit: Minimum assistance Interventions: Manual cues; Tactile cues; Verbal cues; Visual cues; Weight shifting training/Pressure relief Balance: 
Sitting: Impaired(R lean. Manual and verbal cues to shift to left) Sitting - Static: Poor (constant support) Sitting - Dynamic: Poor (constant support) Standing: Impaired; With support Standing - Static: Poor Standing - Dynamic : Poor Ambulation/Gait Training: 
Distance (ft): 2 Feet (ft) Assistive Device: Gait belt; Other (comment)(HHA x 2) Ambulation - Level of Assistance: Moderate assistance;Assist x2 Gait Abnormalities: Decreased step clearance Base of Support: Narrowed; Shift to right Stance: Right increased Step Length: Left shortened Interventions: Manual cues; Tactile cues; Verbal cues Unable to progress forward with ambulation but able to side step along bed. Refused walker however feel pt would benefit. Required manual facilitation at pelvis to shift to left to allow progression of steps. Difficulty moving L LE, greater ease with moving R LE. Pain Rating: 
Pt unable to verbalize pain score however grimaces with movement and indicates it is her R shoulder that hurts. Activity Tolerance:  
Good and requires rest breaks Please refer to the flowsheet for vital signs taken during this treatment. After treatment patient left in no apparent distress:  
Supine in bed, Call bell within reach, and Bed / chair alarm activated COMMUNICATION/COLLABORATION:  
The patients plan of care was discussed with: Occupational therapist and Registered nurse. [facility] [place] Time Calculation: 30 mins

## 2020-09-04 NOTE — ROUTINE PROCESS
Bedside and Verbal shift change report given to Ana (oncoming nurse) by Eloisa Mclain (offgoing nurse). Report included the following information SBAR, Kardex, Intake/Output and MAR. Zone Phone:   [phone] Significant changes during shift:  No longer on tele Patient Information Whit Mclean 60 y.o. 
8/30/2020  9:13 PM by Pranay Costello MD. Whit Mclean was admitted from Home 
 
Problem List 
 
Patient Active Problem List  
 Diagnosis Date Noted  Anorexia  Somnolence  DNR (do not resuscitate) discussion  Advanced care planning/counseling discussion  Goals of care, counseling/discussion  Debility  Frailty  Hypokalemia 08/31/2020  Confusion 08/31/2020  Tremor 08/31/2020  Weakness 08/31/2020  Metastatic lung cancer (metastasis from lung to other site) [facility]) 08/31/2020  Carcinoma of cerebral meninges ([facility] [address] 08/31/2020  Paraplegic gait 08/31/2020  Acute alteration in mental status 08/31/2020  Convulsive syncope 08/31/2020 Past Medical History:  
Diagnosis Date  Hypertension Core Measures: CVA: Yes Yes Activity Status: OOB to Chair No 
Ambulated this shift No  
Bed Rest Yes DVT prophylaxis: DVT prophylaxis Med- Yes DVT prophylaxis SCD or NORM- No  
 
Wounds: (If Applicable) Wounds- Yes Location sacrum Patient Safety: 
 
Falls Score Total Score: 4 Safety Level_______ Bed Alarm On? Yes Sitter? No 
 
Plan for upcoming shift: safety Discharge Plan: No  
 
Active Consults: 
IP CONSULT TO HOSPITALIST 
IP CONSULT TO NEUROLOGY 
IP CONSULT TO HEMATOLOGY 
IP CONSULT TO PALLIATIVE CARE - PROVIDER 
IP CONSULT TO PULMONOLOGY 
IP CONSULT TO GENERAL SURGERY

## 2020-09-04 NOTE — PROGRESS NOTES
Nutrition Assessment Type and Reason for Visit: Sangeetha Parra Nutrition Recommendations/Plan:  
Continue current diet Increase ensure enlive to BID Nutrition Assessment:    
Chart reviewed; medically noted for suspected metastatic lung cancer. S/p LN biopsy. PMH HTN. Patient reports a fair appetite. Eating about half of her meals. No documented PO intakes per flowsheets. She requests to have ensure enlive increased to twice a day. No other nutrition questions/concerns. Encouraged intake of meals. Estimated Daily Nutrient Needs: 
Energy (kcal):  2164 kcal (BMR 1664 x 1. 3AF) Protein (g):  84-105g (0.8-1.0 g/kg bw) Fluid (ml/day):  2150 mL Nutrition Related Findings:      
Na 132, -[phone] Medications: Norvasc, Decadron, famotidine, Losartan BM 9/3 Current Nutrition Therapies: DIET NUTRITIONAL SUPPLEMENTS Lunch; Ensure [employer] DIET CARDIAC Regular Anthropometric Measures: 
· Height:  5' 9\" (175.3 cm) · Current Body Wt:  104.3 kg (229 lb 15 oz) · BMI: 33.9 Nutrition Diagnosis:  
· Inadequate protein-energy intake related to cognitive or neurological impairment(decreased appetite, new dx Cancer) as evidenced by intake 26-50% Nutrition Intervention: 
Food and/or Nutrient Delivery: Continue current diet, Modify oral nutrition supplement Nutrition Education and Counseling: No recommendations at this time Coordination of Nutrition Care: No recommendation at this time Goals: 
PO intake >50% of meals/supplements next 3-5 days Nutrition Monitoring and Evaluation:  
Behavioral-Environmental Outcomes: Knowledge or skill Food/Nutrient Intake Outcomes: Food and nutrient intake, Supplement intake Physical Signs/Symptoms Outcomes: Biochemical data, Weight Discharge Planning:   
Continue oral nutrition supplement, Continue current diet Electronically signed by Rey Mclain RD on 9/4/2020 at 9:45 AM 
 
Contact Number: ext [phone]

## 2020-09-04 NOTE — PROGRESS NOTES
Consult REFERRED BY: 
None CHIEF COMPLAINT: Weakness in the legs Subjective:  
 
Rafael Segura is a 60 y.o. right-handed  female seen at the request of Dr. Heather Olsen for evaluation of new problem of increasing difficulty with leg weakness, right greater than left but not associated with any significant back pain or neck pain, but with some pain radiating into her arms. Is been going on for several months, and she is had a 30 pound weight loss in addition. She had an EMG study of the right lower extremity by orthopedics 2 weeks ago that was unremarkable. She was found to have a large lung cancer on her x-ray today, with metastatic disease throughout the lung and lymph nodes, and may be to the liver, and her MRI scan of the brain shows leptomeningeal and intracranial lesions all consistent with her metastatic lung cancer. Cervical MRI and lumbar MRIs show some degenerative arthritis, but no significant structural brain lesions and no significant leptomeningeal enhancement. Patient denies any headache, or fever or meningismus or difficulty with any of her cranial nerves. Her laboratory studies are relatively unremarkable except for low potassium level. Her bowel and bladder function remained stable. Patient more awake today, still a little encephalopathic and slow mentally. Waiting for the path diagnosis to decide on what therapy needs to be given. Patient has minimal headache. Her neuro exam is stable. Past Medical History:  
Diagnosis Date  Hypertension Past Surgical History:  
Procedure Laterality Date  HX APPENDECTOMY  HX CHOLECYSTECTOMY  HX HYSTERECTOMY  HX TONSIL AND ADENOIDECTOMY Family History Problem Relation Age of Onset  Hypertension Mother  Diabetes Father  No Known Problems Sister  No Known Problems Brother  No Known Problems Other Social History Tobacco Use  Smoking status: Never Smoker  Smokeless tobacco: Never Used Substance Use Topics  Alcohol use: Never Frequency: Never Current Facility-Administered Medications:  
  amLODIPine (NORVASC) tablet 10 mg, 10 mg, Oral, DAILY, Rubina Briggs MD, 10 mg at 09/04/20 0847 
  labetaloL (NORMODYNE;TRANDATE) injection 20 mg, 20 mg, IntraVENous, Q4H PRN, Rubina Briggs MD 
  alcohol 62% (NOZIN) nasal  1 Ampule, 1 Ampule, Topical, Q12H, Brandi Cavanaugh MD, 1 Ampule at 09/04/20 0847 
  zinc oxide-cod liver oil (DESITIN) 40 % paste, , Topical, TID, Rubina Briggs MD 
  sodium chloride (NS) flush 5-40 mL, 5-40 mL, IntraVENous, Q8H, DinIssac MD, 10 mL at 09/04/20 1143   sodium chloride (NS) flush 5-40 mL, 5-40 mL, IntraVENous, PRN, Din, Lydia Bosch MD, 10 mL at 09/02/20 2344   acetaminophen (TYLENOL) tablet 650 mg, 650 mg, Oral, Q6H PRN, 650 mg at 09/02/20 1150 **OR** acetaminophen (TYLENOL) suppository 650 mg, 650 mg, Rectal, Q6H PRN, Lydia Alonzo MD 
  polyethylene glycol (MIRALAX) packet 17 g, 17 g, Oral, DAILY PRN, Lydia Alonzo MD 
  promethazine (PHENERGAN) tablet 12.5 mg, 12.5 mg, Oral, Q6H PRN **OR** ondansetron (ZOFRAN) injection 4 mg, 4 mg, IntraVENous, Q6H PRN, Issac Alonzo MD 
  enoxaparin (LOVENOX) injection 40 mg, 40 mg, SubCUTAneous, DAILY, Issac Alonzo MD, 40 mg at 09/04/20 9540   famotidine (PEPCID) tablet 20 mg, 20 mg, Oral, BID, DinLydia MD, 20 mg at 09/04/20 4682   losartan (COZAAR) tablet 100 mg, 100 mg, Oral, DAILY, Rubina Briggs MD, 100 mg at 09/04/20 0847 
  dexamethasone (DECADRON) 4 mg/mL injection 4 mg, 4 mg, IntraVENous, Q6H, Harsh Mcbride MD, 4 mg at 09/04/20 1143 Allergies Allergen Reactions  Pcn [Penicillins] Hives MRI Results (most recent): 
Results from Hospital Encounter encounter on 08/30/20 MRI [facility] SPINE W WO CONT Narrative EXAM:  MRI [facility] SPINE W WO CONT INDICATION:   paraplegia COMPARISON: MRI cervical lumbar spine 8/31/2020, CT chest 8/31/2020. TECHNIQUE: Multiplanar multisequence acquisition without and with contrast of 
the thoracic spine. CONTRAST: 20 cc Dotarem. FINDINGS: 
There are multiple small osseous metastases noted within the thoracic spine, for 
example, in the T7 vertebra measuring 6 mm (series 7 image 7), in the T7 
vertebra measuring 11 mm (series 7 image 4), in the T8 vertebra measuring 9 mm 
(series 7 image 7), and in the T10 vertebra measuring 10 mm (series 7 image 6). There is mild enhancement associated with the previously described metastases. There are also multiple vertebral body hemangiomas noted, largest in the T7 
posterior vertebral body, T10 central vertebral body, T12 vertebral body, and L1 
posterior vertebral body. There is no definite evidence of leptomeningeal 
enhancement in the thoracic spine, with evaluation limited by motion. There is normal alignment of the thoracic spine. Vertebral body heights are 
maintained without evidence of acute fracture. Minimal small scattered disc 
bulges and protrusions throughout the thoracic spine without significant spinal 
canal stenosis at any level. . The thoracic cord is normal in size and signal, 
though with evaluation significantly limited by motion. Redemonstrated left 
upper lobe mass with innumerable pulmonary nodule metastases, and mediastinal 
and hilar metastatic lymphadenopathy. Impression IMPRESSION:   
1. Multiple small osseous metastases scattered throughout the thoracic spine, 
which are intermixed among multiple hemangiomas within the thoracic spine. 2. No definite evidence of leptomeningeal metastatic disease in the thoracic 
spine, though with evaluation significantly limited by motion. 3. Redemonstrated left upper lobe mass, metastatic mediastinal and hilar 
lymphadenopathy, and innumerable pulmonary nodule metastases. 4. No significant spinal canal stenosis at any level. No definite cord signal 
abnormality, with evaluation significantly limited by motion. Results from [facility] Encounter encounter on 08/30/20 MRI [address] SPINE W WO CONT Narrative EXAM:  MRI [address] SPINE W WO CONT INDICATION:   paraplegia COMPARISON: MRI cervical lumbar spine 8/31/2020, CT chest 8/31/2020. TECHNIQUE: Multiplanar multisequence acquisition without and with contrast of 
the thoracic spine. CONTRAST: 20 cc Dotarem. FINDINGS: 
There are multiple small osseous metastases noted within the thoracic spine, for 
example, in the T7 vertebra measuring 6 mm (series 7 image 7), in the T7 
vertebra measuring 11 mm (series 7 image 4), in the T8 vertebra measuring 9 mm 
(series 7 image 7), and in the T10 vertebra measuring 10 mm (series 7 image 6). There is mild enhancement associated with the previously described metastases. There are also multiple vertebral body hemangiomas noted, largest in the T7 
posterior vertebral body, T10 central vertebral body, T12 vertebral body, and L1 
posterior vertebral body. There is no definite evidence of leptomeningeal 
enhancement in the thoracic spine, with evaluation limited by motion. There is normal alignment of the thoracic spine. Vertebral body heights are 
maintained without evidence of acute fracture. Minimal small scattered disc 
bulges and protrusions throughout the thoracic spine without significant spinal 
canal stenosis at any level. . The thoracic cord is normal in size and signal, 
though with evaluation significantly limited by motion. Redemonstrated left 
upper lobe mass with innumerable pulmonary nodule metastases, and mediastinal 
and hilar metastatic lymphadenopathy. Impression IMPRESSION:   
1. Multiple small osseous metastases scattered throughout the thoracic spine, 
which are intermixed among multiple hemangiomas within the thoracic spine. 2. No definite evidence of leptomeningeal metastatic disease in the thoracic 
spine, though with evaluation significantly limited by motion. 3. Redemonstrated left upper lobe mass, metastatic mediastinal and hilar 
lymphadenopathy, and innumerable pulmonary nodule metastases. 4. No significant spinal canal stenosis at any level. No definite cord signal 
abnormality, with evaluation significantly limited by motion. Review of Systems: A comprehensive review of systems was negative except for: Constitutional: positive for fatigue, malaise and weight loss Musculoskeletal: positive for myalgias, arthralgias, stiff joints and muscle weakness Neurological: positive for coordination problems, gait problems and weakness Vitals:  
 09/04/20 0445 09/04/20 0640 09/04/20 0943 09/04/20 1046 BP: 155/83 160/80  135/87 Pulse: 86 75  (!) 117 Resp:  16  16 Temp: 97.6 °F (36.4 °C) 97.6 °F (36.4 °C)  97.8 °F (36.6 °C) SpO2:  97%  100% Weight:      
Height:   5' 9\" (1.753 m) Objective: I 
 
 
NEUROLOGICAL EXAM: 
 
Appearance: The patient is well developed, well nourished, provides a poor history and is in no acute distress. Mental Status: Oriented to time, place and person, and the president, cognitive function is a bit slow and mildly abnormal and speech is fluent and no aphasia or dysarthria. Mood and affect appropriate but very anxious and mildly encephalopathic. Cranial Nerves:   Intact visual fields. Fundi are benign, disc are flat, no lesions seen on funduscopy. YESENIA, EOM's full, no nystagmus, no ptosis. Facial sensation is normal. Corneal reflexes are not tested. Facial movement is symmetric. Hearing is normal bilaterally. Palate is midline with normal sternocleidomastoid and trapezius muscles are normal. Tongue is midline. Neck without meningismus or bruits Temporal arteries are not tender or enlarged TMJ areas are not tender on palpation Motor:  5/5 strength in upper proximal and distal muscles, but strength in the right leg is about 3/5 and in the left leg about 4/5. Normal bulk and tone. No fasciculations. Rapid alternating movement is intact on the left and a bit slow on the right Reflexes:   Deep tendon reflexes 2+/4 on the left and and 3+/4 on the right. No babinski or clonus present Sensory:   Normal to touch, pinprick and vibration and temperature. DSS is intact Gait:  Not tested. Tremor:   No tremor noted. Cerebellar:  Not tested cerebellar signs on Romberg and tandem testing and finger-nose-finger exam.  
Neurovascular:  Normal heart sounds and regular rhythm, peripheral pulses intact, and no carotid bruits. Assessment: ICD-10-CM ICD-9-CM 1. Bilateral leg weakness  R29.898 729.89   
2. Weight loss  R63.4 783.21   
3. Fall in home, initial encounter  W19. Claudeen Herd E992.5 Y92.009 E849.0 4. Acute alteration in mental status  R41.82 780.97   
5. Carcinoma of cerebral meninges (HCC)  C70.0 192.1 6. Confusion  R41.0 298.9 7. Convulsive syncope  R55 780.2   
  780.39   
8. Lung cancer metastatic to brain ([facility] [address]  C34.90 162.9   
 C79.31 198.3 9. Paraplegic gait  R26.1 781.2 10. Bilateral carotid artery stenosis  I65.23 433.10   
  433.30 11. Cerebral microvascular disease  I67.9 437.9 12. Lymphadenopathy  R59.1 785.6 13. Primary malignant neoplasm of right lung metastatic to other site [facility])  C34.91 162.9 Active Problems: Hypokalemia (8/31/2020) Confusion (8/31/2020) Tremor (8/31/2020) Weakness (8/31/2020) Metastatic lung cancer (metastasis from lung to other site) [facility]) (8/31/2020) Carcinoma of cerebral meninges ([facility] [address] (8/31/2020) Paraplegic gait (8/31/2020) Acute alteration in mental status (8/31/2020) Convulsive syncope (8/31/2020) Somnolence () 
 
  DNR (do not resuscitate) discussion () Advanced care planning/counseling discussion () Goals of care, counseling/discussion () Debility () Frailty () Anorexia () Plan:  
 
Patient stable, minimal headache, no neuro changes, and oncology is awaiting results to decide on therapy. Patient on Decadron 4 times daily 4 mg. Patient has leptomeningeal and intracranial metastatic disease, most likely causing her symptoms, and the right leg is probably more involved than the left because of a left pontine lesion. We will continue steroids, and she will need a biopsy and radiation therapy consults once we have a tissue diagnosis MRI of the thoracic spine was done today, and did show bony metastases but no clear leptomeningeal or compressive lesions. Discussed with patient and her  in detail explained her diagnosis prognosis of the sphenoid for now. Tragic case Not much for us to do here, we will see as needed, call if we can help. Signed By: Conor Dodge MD   
 September 4, 2020 CC: None FAX: None 
9 AM

## 2020-09-04 NOTE — PROGRESS NOTES
Problem: Mobility Impaired (Adult and Pediatric) Goal: *Acute Goals and Plan of Care (Insert Text) Description: FUNCTIONAL STATUS PRIOR TO ADMISSION: Pt lives with  on first floor of home with 3 step entry. Prior to recent decline, pt had been able to amb w/o device on her own. Her  reports a rapid decline with multiple (many) falls. HOME SUPPORT PRIOR TO ADMISSION: The patient lived with  but did not require assist. 
 
Physical Therapy Goals Initiated 9/1/2020 1. Patient will move from supine to sit and sit to supine , scoot up and down, and roll side to side in bed with supervision/set-up within 7 day(s). 2.  Patient will transfer from bed to chair and chair to bed with minimal assistance/contact guard assist using the least restrictive device within 7 day(s). 3.  Patient will perform sit to stand with minimal assistance/contact guard assist within 7 day(s). 4.  Patient will ambulate with minimal assistance/contact guard assist for 50 feet with the least restrictive device within 7 day(s). 5.  Patient will ascend/descend 3 stairs with handrail(s) with minimal assistance/contact guard assist within 7 day(s). Outcome: Not Progressing Towards Goal 
 PHYSICAL THERAPY TREATMENT Patient: Shannan Torres (44 y.o. female) Date: 9/4/2020 Diagnosis: Weakness [R53.1] Tremor [R25.1] Confusion [R41.0] Hypokalemia [E87.6] <principal problem not specified> Procedure(s) (LRB): LEFT SUPERCLAVICULAR  LYMPH NODE BIOPSY (Left) 2 Days Post-Op Precautions:  Fall risk Chart, physical therapy assessment, plan of care and goals were reviewed. ASSESSMENT Patient continues with skilled PT services and is not progressing towards goals. Patient presents with decreased strength, endurance, balance, mobility and functional ability. Patient received laying supine in bed. Patient able to perform bed mobility with mod-max assist x 2.  Patient complained of R hip pain when attempting to scoot forward. Patient able to transfer from sit to stand with mod assist x 2 but unable to take any steps with hand held assist x 2 despite max verbal, visual and tactile cues. Rolling walker would not be beneficial due to cognitive status. Limited command following due to poor processing and potential behavior impairments. Current Level of Function Impacting Discharge (mobility/balance): mod-max assist x 2 Other factors to consider for discharge: Patient will require constant support of caregiver, lives with  PLAN : 
Patient continues to benefit from skilled intervention to address the above impairments. Continue treatment per established plan of care. to address goals. Recommendation for discharge: (in order for the patient to meet his/her long term goals) Therapy up to 5 days/week in SNF setting This discharge recommendation: 
Has been made in collaboration with the attending provider and/or case management IF patient discharges home will need the following DME: none SUBJECTIVE:  
Patient stated I hear you.  OBJECTIVE DATA SUMMARY:  
Critical Behavior: 
Neurologic State: Alert, Lethargic Orientation Level: Oriented to person, Oriented to place Cognition: Decreased attention/concentration Safety/Judgement: Fall prevention Functional Mobility Training: 
Bed Mobility: 
Rolling: Moderate assistance;Assist x1 Supine to Sit: Moderate assistance;Assist x2 Scooting: Moderate assistance;Assist x2 Transfers: 
Sit to Stand: Moderate assistance;Assist x2 Stand to Sit: Moderate assistance;Assist x2 Stand Pivot Transfers: Maximum assistance;Assist x2 Bed to Chair: Maximum assistance;Assist x2 Balance: 
Sitting: Impaired; With support Sitting - Static: Poor (constant support) Sitting - Dynamic: Poor (constant support) Standing: Impaired; With support Standing - Static: Poor Standing - Dynamic : Poor Activity Tolerance:  
Poor and requires frequent rest breaks Please refer to the flowsheet for vital signs taken during this treatment. After treatment patient left in no apparent distress:  
Sitting in chair, Call bell within reach and Bed / chair alarm activated COMMUNICATION/COLLABORATION:  
The patients plan of care was discussed with: Occupational therapist, Registered nurse and Case management. Dean Villalba Time Calculation: 34 mins

## 2020-09-04 NOTE — PROGRESS NOTES
Problem: Self Care Deficits Care Plan (Adult) Goal: *Acute Goals and Plan of Care (Insert Text) Description:  
FUNCTIONAL STATUS PRIOR TO ADMISSION: Patient was modified independent using a single point cane for functional mobility. HOME SUPPORT: The patient lived with  and needed assist for transfers and per chart has had a slow decline and has had numerous falls PTA. Occupational Therapy Goals Initiated 9/3/2020 1. Patient will perform grooming sitting on EOB, in midline with supervision/set-up within 7 day(s). 2.  Patient will perform bathing UB sitting on EOB with moderate assistance  within 7 day(s). 3.  Patient will perform upper body dressing with minimal assistance/contact guard assist within 7 day(s). 4.  Patient will perform toilet transfers with moderate assistance  within 7 day(s). 5.  Patient will perform all aspects of toileting with minimal assistance/contact guard assist within 7 day(s). 6.  Patient will participate in upper extremity therapeutic exercise/activities with supervision/set-up for 5 minutes within 7 day(s). 7.  Patient will utilize energy conservation techniques during functional activities with verbal and tactile cues within 7 day(s). Outcome: Progressing Towards Goal 
  
OCCUPATIONAL THERAPY TREATMENT Patient: Masoud Irby (44 y.o. female) Date: 9/4/2020 Diagnosis: Weakness [R53.1] Tremor [R25.1] Confusion [R41.0] Hypokalemia [E87.6] <principal problem not specified> Procedure(s) (LRB): LEFT SUPERCLAVICULAR  LYMPH NODE BIOPSY (Left) 2 Days Post-Op Precautions:   
Chart, occupational therapy assessment, plan of care, and goals were reviewed. ASSESSMENT Patient continues with skilled OT services and is progressing towards goals. Pt continue to present with decreased strength, impaired sitting and standing balance, poor safety awareness, slow processing, impaired sequencing and initiation, and decreased ADL participation and mobility. Pt is received in bed agreeable to participate, able to verbalize short responses to questions with increased time. She requires multi-modal cues to don gown and requires mod A x 2 to mobilize to EOB. She displays fair balance with noted posterior lean. Attempted to have her reach to pull up sock to facilitate anterior weightshift but pt with report of R hip pain during forward reaching. She does participate in 2 standing trials but is unable to progress steps despite max cues. Once up to chair with max A x2, pt becomes minimally verbal and does not respond to questions (does not nod/shake head) despite increased time, however once MD arrives, pt able to nod and provide some responses to questions, therefore anticipate behavioral limitations this session. Current Level of Function Impacting Discharge (ADLs): A x 2 for transfers; up to max/total A for ADLs Other factors to consider for discharge: noted palliative meeting today with ; pending biopsy; ongoing discussions regarding goals of care; lives with supportive ; rapid decline; high fall risk; cognitive and behavioral impairments PLAN : 
Patient continues to benefit from skilled intervention to address the above impairments. Continue treatment per established plan of care. to address goals. Recommend with staff: 2 person assist for SPT to bed/chair; OOB to chair for at least 1 hour/day Recommend next OT session: continue with tolerance to activity; ADLs as able; command following Recommendation for discharge: (in order for the patient to meet his/her long term goals) Therapy up to 5 days/week in SNF setting This discharge recommendation: 
Has been made in collaboration with the attending provider and/or case management IF patient discharges home will need the following DME: TBD; pt would require 24/7 physical assistance for all mobility and ADLs SUBJECTIVE:  
Patient stated I heard you.  OBJECTIVE DATA SUMMARY:  
Cognitive/Behavioral Status: 
Neurologic State: Alert Orientation Level: Oriented to person;Oriented to place Cognition: Decreased attention/concentration;Decreased command following; Impaired decision making;Poor safety awareness Perception: Cues to maintain midline in sitting; Tactile;Verbal;Visual(posterior lean) Perseveration: No perseveration noted Safety/Judgement: Fall prevention Functional Mobility and Transfers for ADLs: 
Bed Mobility: 
Rolling: Moderate assistance;Assist x1 Supine to Sit: Moderate assistance;Assist x2 Scooting: Moderate assistance;Assist x2 Transfers: 
Sit to Stand: Moderate assistance;Assist x2 Bed to Chair: Maximum assistance;Assist x2 Balance: 
Sitting: Impaired; With support Sitting - Static: Poor (constant support) Sitting - Dynamic: Poor (constant support) Standing: Impaired; With support Standing - Static: Poor Standing - Dynamic : Poor ADL Intervention: 
 
Upper Body Dressing Assistance Hospital Gown: Moderate assistance Cues: Physical assistance; Tactile cues provided;Verbal cues provided;Visual cues provided Lower Body Dressing Assistance Socks: Maximum assistance; Total assistance (dependent)(pt unable to reach distal LEs; reports hip p!) Leg Crossed Method Used: No 
Position Performed: Seated edge of bed Cues: Physical assistance; Tactile cues provided;Verbal cues provided;Visual cues provided Cognitive Retraining Safety/Judgement: Fall prevention Pain: Pt with facial grimace and holding R hip and low back during scooting to EOB and reaching to manage socks Activity Tolerance:  
Fair, Poor and limited by cognitive and behavioral impairments Please refer to the flowsheet for vital signs taken during this treatment. After treatment patient left in no apparent distress:  
Sitting in chair, Call bell within reach, Bed / chair alarm activated and MD present COMMUNICATION/COLLABORATION:  
 The patients plan of care was discussed with: Physical therapist, Registered nurse and Physician. Jaime Pittman OT Time Calculation: 31 mins

## 2020-09-04 NOTE — PROGRESS NOTES
BONI:  
1) Palliative meeting at 11:00 AM  
2) Pt will need a DDNR prior to d/c  
 
CM Consult Noted-  
12:03 PM- CM noted consult for IPR- reviewed chart and noted Palliative meeting with pt and  at 11:00 AM. CM not approaching IPR's at this time. CM will continue to follow and assist as needed. FABRICE Cueto, CM [facility] [phone]

## 2020-09-04 NOTE — PROGRESS NOTES
Hospitalist Progress Note NAME: Cayden Bland :  1955 MRN:  [id number] Assessment / Plan: 
 
18-year-old female complaining of a few months of generalized weakness, frequent falls, extremity weakness especially in the legs, weight loss, confusion and forgetfulness. On exam Mild tremor.  4/5 strength bilateral lower extremities. Of note she just had a recent head CT  Head on   which was normal, just showed microvascular changes.  Her H&P is not at all suggestive of acute CVA, no indication for CT angiography at this time. Labs reassuring Except k 3.3 . Per patient she also lost almost 30 pounds of weight in last couple of month because she did not feel like eating. Patient has history of gastric sleeve for weight loss 4 years ago. Patient denied to be depressed. 
  
  
Generalized weaknesss more in Bilateral leg weakness( intermittent) poa Weight loss poa Mild tremors Concern for metastatic lung ca to brain , thoracic spine Nonfocal exam. Progressive symptoms for months. CT scan a couple weeks ago nonspecific. MRI c spine No acute abnormality. 2. Mild spinal stenosis at C4-5. Neural foraminal narrowing as above. RI cervical spine : 
 
 MRI  Lumbar spine showed : 
1. Several scattered osseous metastases. 2. Multilevel spondylosis as above MRI brain : 
1. Extensive leptomeningeal enhancement as well as several subtle areas of 
parenchymal enhancement, highly suspicious for metastatic disease given findings 
on chest CT earlier today suspicious for bronchogenic carcinoma. 2. Extensive chronic white matter disease and areas of remote infarction, with 
no acute intracranial hemorrhage or infarction. - 
- tsh wnl hiv b12 folate wnl   rpr non reactive  ua utox negative    cpk wnl   a1c 5.3 FLP 
-We will check zinc and selenium as patient has history of gastric sleeve operation to rule out deficiency 
-neuro consulted 
-pt/ot consulted Going for LN biopsy Ct scan chest : 
IMPRESSION: Advanced lung carcinoma: 
1. Large left upper lobe lung carcinoma. 2. Numerous jany metastases, including contralateral mediastinal and 
ipsilateral supraclavicular. 3. Diffuse, bilateral, pulmonary metastases. 4. Scattered osseous metastases. 5. Few hepatic metastases. CT chest : 
 
MRI thoracic spine : 
. Multiple small osseous metastases scattered throughout the thoracic spine, 
which are intermixed among multiple hemangiomas within the thoracic spine. 2. No definite evidence of leptomeningeal metastatic disease in the thoracic 
spine, though with evaluation significantly limited by motion. 3. Redemonstrated left upper lobe mass, metastatic mediastinal and hilar 
lymphadenopathy, and innumerable pulmonary nodule metastases. 4. No significant spinal canal stenosis at any level. No definite cord signal 
abnormality, with evaluation significantly limited by motion. 
  
S/p LN biopsy Left supraclavicular lymphadenopathy on 09/02 Awaiting path Palliative on board Hospice consulted  
  
UTI Started on ceftriaxone Hypokalemia- repleted  Monitor   Mag wnl . 
  
Uncontrolled HTN-172/91  Resume cozaar , start norvasc  And give iv hydralazine prn 
+ prn labetolol  
  
  
Code Status: full Surrogate Decision Maker: Myrna Naranjo  Spouse  [phone]   
  
  
  
DVT Prophylaxis: sq lovenox GI Prophylaxis: not indicated 
  
Baseline: independent 30.0 - 39.9 Obese / Body mass index is 33.97 kg/m². Subjective: Chief Complaint / Reason for Physician Visit FU leg weakness . pt mute today , didn't want to talk , only nodding . Flat affecr Review of Systems: 
Symptom Y/N Comments  Symptom Y/N Comments Fever/Chills    Chest Pain Poor Appetite    Edema Cough    Abdominal Pain Sputum    Joint Pain SOB/MEZA    Pruritis/Rash Nausea/vomit    Tolerating PT/OT Diarrhea    Tolerating Diet y Constipation    Other Could NOT obtain due to: Uncooperative Objective: VITALS:  
Last 24hrs VS reviewed since prior progress note. Most recent are: 
Patient Vitals for the past 24 hrs: 
 Temp Pulse Resp BP SpO2  
09/04/20 0640 97.6 °F (36.4 °C) 75 16 160/80 97 % 09/04/20 0445 97.6 °F (36.4 °C) 86  155/83   
09/03/20 2339 97.8 °F (36.6 °C) 85 18 165/85 94 % 09/03/20 1830 98.7 °F (37.1 °C) 90 16 166/87 99 % 09/03/20 1536 97.4 °F (36.3 °C) 82 24 151/75 99 % 09/03/20 1124  94  (!) 188/99   
09/03/20 1116 97.9 °F (36.6 °C) (!) 110 20 (!) 166/96 99 % Intake/Output Summary (Last 24 hours) at 9/4/2020 8499 Last data filed at 9/4/2020 1145 Gross per 24 hour Intake  Output 900 ml Net -900 ml PHYSICAL EXAM: 
General: WD, WN. Alert, cooperative, no acute distress   
EENT:  EOMI. Anicteric sclerae. MMM Resp:  CTA bilaterally, no wheezing or rales. No accessory muscle use CV:  Regular  rhythm,  No edema GI:  Soft, Non distended, Non tender.  +Bowel sounds Neurologic:  Alert and oriented X 3, normal speech, Psych:   Poor insight Skin:  No rashes. No jaundice Reviewed most current lab test results and cultures  YES Reviewed most current radiology test results   YES Review and summation of old records today    NO Reviewed patient's current orders and MAR    YES 
PMH/ reviewed - no change compared to H&P 
________________________________________________________________________ Care Plan discussed with: 
  Comments Patient x Family RN x Care Manager Consultant Multidiciplinary team rounds were held today with , nursing, pharmacist and clinical coordinator. Patient's plan of care was discussed; medications were reviewed and discharge planning was addressed. ________________________________________________________________________ Total NON critical care TIME: 35   Minutes Total CRITICAL CARE TIME Spent:   Minutes non procedure based Comments >50% of visit spent in counseling and coordination of care x   
________________________________________________________________________ Lev Johnson MD  
 
Procedures: see electronic medical records for all procedures/Xrays and details which were not copied into this note but were reviewed prior to creation of Plan. LABS: 
I reviewed today's most current labs and imaging studies. Pertinent labs include: No results for input(s): WBC, HGB, HCT, PLT, HGBEXT, HCTEXT, PLTEXT, HGBEXT, HCTEXT, PLTEXT in the last 72 hours. Recent Labs  
  09/04/20 
0502 09/03/20 
0255 * 132* K 3.9 4.0  
CL 99 99 CO2 31 32 * 144* BUN 20 21* CREA 0.55 0.50* CA 8.9 8.9 ALB 2.8* 2.8* TBILI 0.9 0.5 ALT 54 44 Signed: Lev Johnson MD

## 2020-09-04 NOTE — PROGRESS NOTES
Speech pathology note Given diagnosis and prognosis, cognitive-communication evaluation not indicated at this time. SLP will sign off. Please re-consult if further needs arise. Thank you. Marquita Zhao., CCC-SLP

## 2020-09-04 NOTE — PROGRESS NOTES
CA 
CA Bedside and Verbal shift change report given to [address] [place] [address] Road (oncoming nurse) by Eris Horn RN (offgoing nurse). Report included the following information SBAR, Kardex, MAR and Recent Results. Zone Phone:  [phone] Significant changes during shift:   none Patient Information Shannan Torres 60 y.o. 
8/30/2020  9:13 PM by Elda Arshad MD. Shannan Torres was admitted from Home 
 
Problem List 
 
Patient Active Problem List  
 Diagnosis Date Noted  Somnolence  DNR (do not resuscitate) discussion  Advanced care planning/counseling discussion  Goals of care, counseling/discussion  Debility  Frailty  Hypokalemia 08/31/2020  Confusion 08/31/2020  Tremor 08/31/2020  Weakness 08/31/2020  Metastatic lung [facility] lung [facility]) 08/31/2020  Carcinoma of cerebral meninges ([facility] [address] 08/31/2020  Paraplegic gait 08/31/2020  Acute alteration in mental status 08/31/2020  Convulsive syncope 08/31/2020 Past Medical History:  
Diagnosis Date  Hypertension Core Measures: CVA: No Not applicable CHF:No Not applicable PNA:No Not applicable Activity Status: OOB to Chair No 
Ambulated this shift No  
Bed Rest No 
 
Supplemental O2: (If Applicable) NC No 
NRB No 
Venti-mask No 
On room air Liters/min LINES AND DRAINS: 
 
PIV, external female catheter DVT prophylaxis: DVT prophylaxis Med- No 
DVT prophylaxis SCD or NORM- No  
 
Wounds: (If Applicable) Wounds- Yes Location left neck biopsy incision, excoriation groin area Patient Safety: 
 
Falls Score Total Score: 4 Safety Level_______ Bed Alarm On? Yes Sitter? No 
 
Plan for upcoming shift: palliative care consult to contact  Discharge Plan: Yes CM following. Per note 9/3 will contact  for discharge planning Active Consults: 
IP CONSULT TO HOSPITALIST 
IP CONSULT TO NEUROLOGY 
IP CONSULT TO HEMATOLOGY IP CONSULT TO PALLIATIVE CARE - PROVIDER 
IP CONSULT TO PULMONOLOGY 
IP CONSULT TO GENERAL SURGERY

## 2020-09-04 NOTE — HOSPICE
[address] [facility] 
[phone] Patient Name: Bin Sarabia YOB: 1955 Age: 60 y.o. [address] RN Note:  Hospice consult received, chart reviewed. In to assess patient who is sitting up in her chair. Patient makes eye contact when spoken to but does not respond otherwise.  reports that she \"has trouble processing things\". Goals of care and hospice philosophy discussed with , Franchesca Opal who states they have no family in the area but they have a few neighbors who would be willing to sit in as caregiver a few times a week so he can grocery shop etc. Weekend CM, please refer this patient to another hospice agency.

## 2020-09-04 NOTE — PROGRESS NOTES
Palliative Medicine Consult Jung: [phone] ([phone]) Patient Name: Shannan Torres YOB: 1955 Date of Initial Consult: 9/2/2020 Reason for Consult: Care Decisions Requesting Provider: Gary Morrison NP 
Primary Care Physician: None SUMMARY:  
Shannan Torres is a 60 y.o. with a past history of HTN, who was admitted on 8/30/2020 from home with a diagnosis of generalized weakness, weight loss and tremors. After admission, she was found to have metastatic cancer and has biopsies pending. Current medical issues leading to Palliative Medicine involvement include: goals of care discussion in the setting of advanced cancer Social:  She lives with her  Brunilda Tavarez of 15 years and their [facility]. She has one daughter who lives in [place] and one grandchild. Her  has 3 children, none of whom are local.  Her mom is still living and she has 2 sisters as well. 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  
1. Frailty 2. DNR Disucssion 3. Advanced Care Planning 4. Goals of Care 5. Debility 6. Anorexia PLAN:  
1. I met with Mr Izaiah Jimenez and his wife in the room- he is very hard of hearing so mostly I was talking directly to him, but she was clearly listening. I am not sure how much she was able to process, but I think she understood the gist of the conversation 2. Mr Izaiah Jimenez is very overwhelmed with the logistical aspects of her declining health. She was responsible for the insurance, bills, etc, and he does not have access to the passwords for many accounts (and she cannot remember them). This is causing a lot of stress for him, as he is not sure where to go for help. Talked to Donald Arenas and she is already in touch with him to help as much as we can 3. We talked through her decline over the last few weeks, and the cancer diagnosis.   I talked with the oncology team so was able to share with him the preliminary results of the the biopsy 4. Mr Pam Stephenson asked me prognosis in time- I let him know that I suspected she probably only had a few months left based on how extensive her disease was, and the fact that she is not eating- but it could be sooner than that if her disease progressed faster 5. We talked about hospice support at length, as he stated over and over that he was committed to quality of life over quantity. He has a lot of fears around cancer treatment, sharing that he has had friends go through it, and it only got them a few more months, but they were miserable ones 6. He is agreeable to a hospice consult, in order to get the ball rolling, and in the meantime he and his wife will talk more 7. He is going to need a lot of emotional support through this. They do not have a lot of emotional support here locally- all their family life out of town, and Ms Pam Stephenson is not close to her family, and asked her  not to share her diagnosis with them. He did say he was going to ask his sister to come help him work through how to pay their bills 8. Did not sign DDNR with them yet, as it was quite a lot to process through everything we talked about, but Ms Pam Stephenson was very clear that she wants to be aloud to pass in peace when it is her time, and Mr Pam Stephenson agreed with this plan. She will need one at discharge 9. I did not do an AMD with her, as her very slow processing makes complex decision making difficult for her, and she clearly relies on her  for support. 10.  Initial consult note routed to primary continuity provider and/or primary health care team members 11. Communicated plan of care with: Palliative Becca AYALA 192 Team 
 
 GOALS OF CARE / TREATMENT PREFERENCES:  
 
GOALS OF CARE: 
Patient/Health Care Proxy Stated Goals: (TBD) TREATMENT PREFERENCES:  
Code Status: DNR Advance Care Planning: [x] [facility] Interdisciplinary Team has updated the ACP Navigator with Health Care Decision Maker and Patient Capacity Primary Decision Maker: Dylon car - [phone] Medical Interventions: Full interventions Other Instructions: Other: As far as possible, the palliative care team has discussed with patient / health care proxy about goals of care / treatment preferences for patient. HISTORY:  
 
History obtained from: chart CHIEF COMPLAINT: None she could verbalize HPI/SUBJECTIVE: The patient is:  
[x] Verbal and participatory [] Non-participatory due to:  
 
Nodded to simple questions Only responded verbally once \"Im fine\" Clinical Pain Assessment (nonverbal scale for severity on nonverbal patients):  
Clinical Pain Assessment Severity: 0 Duration: for how long has pt been experiencing pain (e.g., 2 days, 1 month, years) Frequency: how often pain is an issue (e.g., several times per day, once every few days, constant) FUNCTIONAL ASSESSMENT:  
 
Palliative Performance Scale (PPS): PPS: 40 PSYCHOSOCIAL/SPIRITUAL SCREENING:  
 
Palliative IDT has assessed this patient for cultural preferences / practices and a referral made as appropriate to needs (Cultural Services, Patient Advocacy, Ethics, etc.) Any spiritual / Alevism concerns: 
[] Yes /  [x] No 
 
Caregiver Burnout: 
[] Yes /  [x] No /  [] No Caregiver Present Anticipatory grief assessment:  
[x] Normal  / [] Maladaptive ESAS Anxiety: Anxiety: 0 
 
ESAS Depression:    
 
 
 REVIEW OF SYSTEMS:  
 
Positive and pertinent negative findings in ROS are noted above in HPI. The following systems were [x] reviewed / [] unable to be reviewed as noted in HPI Other findings are noted below.  
Systems: constitutional, ears/nose/mouth/throat, respiratory, gastrointestinal, genitourinary, musculoskeletal, integumentary, neurologic, psychiatric, endocrine. Positive findings noted below. Modified ESAS Completed by: provider Fatigue: 7 Pain: 0 Anxiety: 0 Dyspnea: 0 Stool Occurrence(s): 1 PHYSICAL EXAM:  
 
From RN flowsheet: 
Wt Readings from Last 3 Encounters:  
09/02/20 230 lb (104.3 kg) Blood pressure 135/87, pulse (!) 117, temperature 97.8 °F (36.6 °C), resp. rate 16, height 5' 9\" (1.753 m), weight 230 lb (104.3 kg), SpO2 100 %, not currently breastfeeding. Pain Scale 1: Numeric (0 - 10) Pain Intensity 1: 0 Pain Onset 1: acute Pain Location 1: Shoulder Pain Orientation 1: Right Pain Description 1: Aching Pain Intervention(s) 1: Rest 
Last bowel movement, if known:  
 
Constitutional: awake, listening to the conversation, slow to respond ENMT: no nasal discharge, moist mucous membranes Cardiovascular: regular rhythm, distal pulses intact Respiratory: breathing not labored, symmetric Skin: warm, dry HISTORY:  
 
Active Problems: Hypokalemia (8/31/2020) Confusion (8/31/2020) Tremor (8/31/2020) Weakness (8/31/2020) Metastatic lung cancer (metastasis from lung to other site) [facility]) (8/31/2020) Carcinoma of cerebral meninges ([facility] [address] (8/31/2020) Paraplegic gait (8/31/2020) Acute alteration in mental status (8/31/2020) Convulsive syncope (8/31/2020) Somnolence () 
 
  DNR (do not resuscitate) discussion () Advanced care planning/counseling discussion () Goals of care, counseling/discussion () Debility () Frailty () Past Medical History:  
Diagnosis Date  Hypertension Past Surgical History:  
Procedure Laterality Date  HX APPENDECTOMY  HX CHOLECYSTECTOMY  HX HYSTERECTOMY  HX TONSIL AND ADENOIDECTOMY Family History Problem Relation Age of Onset  Hypertension Mother  Diabetes Father  No Known Problems Sister  No Known Problems Brother  No Known Problems Other History reviewed, no pertinent family history. Social History Tobacco Use  Smoking status: Never Smoker  Smokeless tobacco: Never Used Substance Use Topics  Alcohol use: Never Frequency: Never Allergies Allergen Reactions  Pcn [Penicillins] Hives Current Facility-Administered Medications Medication Dose Route Frequency  amLODIPine (NORVASC) tablet 10 mg  10 mg Oral DAILY  labetaloL (NORMODYNE;TRANDATE) injection 20 mg  20 mg IntraVENous Q4H PRN  
 cefTRIAXone (ROCEPHIN) 1 g in 0.9% sodium chloride (MBP/ADV) 50 mL  1 g IntraVENous Q24H  
 alcohol 62% (NOZIN) nasal  1 Ampule  1 Ampule Topical Q12H  
 zinc oxide-cod liver oil (DESITIN) 40 % paste   Topical TID  sodium chloride (NS) flush 5-40 mL  5-40 mL IntraVENous Q8H  
 sodium chloride (NS) flush 5-40 mL  5-40 mL IntraVENous PRN  
 acetaminophen (TYLENOL) tablet 650 mg  650 mg Oral Q6H PRN Or  
 acetaminophen (TYLENOL) suppository 650 mg  650 mg Rectal Q6H PRN  polyethylene glycol (MIRALAX) packet 17 g  17 g Oral DAILY PRN  promethazine (PHENERGAN) tablet 12.5 mg  12.5 mg Oral Q6H PRN Or  
 ondansetron (ZOFRAN) injection 4 mg  4 mg IntraVENous Q6H PRN  
 enoxaparin (LOVENOX) injection 40 mg  40 mg SubCUTAneous DAILY  famotidine (PEPCID) tablet 20 mg  20 mg Oral BID  losartan (COZAAR) tablet 100 mg  100 mg Oral DAILY  dexamethasone (DECADRON) 4 mg/mL injection 4 mg  4 mg IntraVENous Q6H  
 
 
 
 LAB AND IMAGING FINDINGS:  
 
Lab Results Component Value Date/Time WBC 10.6 09/01/2020 02:54 AM  
 HGB 13.5 09/01/2020 02:54 AM  
 PLATELET 761 38/20/4126 02:54 AM  
 
Lab Results Component Value Date/Time  Sodium 132 (L) 09/04/2020 05:02 AM  
 Potassium 3.9 09/04/2020 05:02 AM  
 Chloride 99 09/04/2020 05:02 AM  
 CO2 31 09/04/2020 05:02 AM  
 BUN 20 09/04/2020 05:02 AM  
 Creatinine 0.55 09/04/2020 05:02 AM  
 Calcium 8.9 09/04/2020 05:02 AM  
 Magnesium 2.1 08/31/2020 02:32 AM  
 Lab Results Component Value Date/Time Alk. phosphatase 100 09/04/2020 05:02 AM  
 Protein, total 6.4 09/04/2020 05:02 AM  
 Albumin 2.8 (L) 09/04/2020 05:02 AM  
 Globulin 3.6 09/04/2020 05:02 AM  
 
Lab Results Component Value Date/Time INR 1.2 (H) 08/31/2020 02:32 AM  
 Prothrombin time 12.0 (H) 08/31/2020 02:32 AM  
  
No results found for: IRON, FE, TIBC, IBCT, PSAT, FERR No results found for: PH, PCO2, PO2 No components found for: [place] Lab Results Component Value Date/Time CK 85 08/31/2020 02:32 AM  
  
 
 
   
 
Total time: 120m Counseling / coordination time, spent as noted above: 100m 
> 50% counseling / coordination?: Y Prolonged service was provided for  []30 min   [x]75 min in face to face time in the presence of the patient, spent as noted above. Time Start: 11-12noon Time End:   1pm-2pm 
Note: this can only be billed with  (initial) or 21 [phone] (follow up). If multiple start / stop times, list each separately.

## 2020-09-04 NOTE — PROGRESS NOTES
Problem: Falls - Risk of 
Goal: *Absence of Falls Description: Document Lanie Dear Fall Risk and appropriate interventions in the flowsheet. Outcome: Progressing Towards Goal 
Note: Fall Risk Interventions: 
Mobility Interventions: Bed/chair exit alarm, Communicate number of staff needed for ambulation/transfer, OT consult for ADLs, Patient to call before getting OOB, PT Consult for mobility concerns, PT Consult for assist device competence Mentation Interventions: Bed/chair exit alarm, Door open when patient unattended Medication Interventions: Bed/chair exit alarm, Evaluate medications/consider consulting pharmacy, Patient to call before getting OOB Elimination Interventions: Bed/chair exit alarm, Call light in reach, Patient to call for help with toileting needs History of Falls Interventions: Bed/chair exit alarm, Consult care management for discharge planning, Door open when patient unattended Problem: Pressure Injury - Risk of 
Goal: *Prevention of pressure injury Description: Document Chu Scale and appropriate interventions in the flowsheet. Outcome: Progressing Towards Goal 
Note: Pressure Injury Interventions: 
Sensory Interventions: Assess changes in LOC Moisture Interventions: Absorbent underpads, Internal/External urinary devices Activity Interventions: Increase time out of bed, Pressure redistribution bed/mattress(bed type) Mobility Interventions: Float heels, Pressure redistribution bed/mattress (bed type), HOB 30 degrees or less, Turn and reposition approx. every two hours(pillow and wedges) Nutrition Interventions: Document food/fluid/supplement intake Friction and Shear Interventions: Apply protective barrier, creams and emollients, Minimize layers

## 2020-09-05 NOTE — PROGRESS NOTES
CA 
CA Bedside and Verbal shift change report given to 31 Patricia Allred (oncoming nurse) by Deepa Smith RN (offgoing nurse). Report included the following information SBAR, Kardex, MAR and Recent Results. Zone Phone:   [phone] Significant changes during shift:   
none Patient Information Pedro De La Vega 60 y.o. 
8/30/2020  9:13 PM by Genny Luis MD. Pedro De La Vega was admitted from Home 
 
Problem List 
 
Patient Active Problem List  
 Diagnosis Date Noted  Anorexia  Somnolence  DNR (do not resuscitate) discussion  Advanced care planning/counseling discussion  Goals of care, counseling/discussion  Debility  Frailty  Hypokalemia 08/31/2020  Confusion 08/31/2020  Tremor 08/31/2020  Weakness 08/31/2020  Metastatic lung cancer (metastasis from lung to other site) [facility]) 08/31/2020  Carcinoma of cerebral meninges ([facility] [address] 08/31/2020  Paraplegic gait 08/31/2020  Acute alteration in mental status 08/31/2020  Convulsive syncope 08/31/2020 Past Medical History:  
Diagnosis Date  Hypertension Core Measures: CVA: No Not applicable CHF:No Not applicable PNA:No Not applicable Activity Status: OOB to Chair No 
Ambulated this shift No  
Bed Rest No 
 
Supplemental O2: (If Applicable) NC No 
NRB No 
Venti-mask No 
On room air Liters/min LINES AND DRAINS: 
 
PIV, external catheter DVT prophylaxis: DVT prophylaxis Med- Yes DVT prophylaxis SCD or NORM- No  
 
Wounds: (If Applicable) Wounds- No 
 
Location none- but excoriation in groin area Patient Safety: 
 
Falls Score Total Score: 4 Safety Level_______ Bed Alarm On? Yes Sitter? No 
 
Plan for upcoming shift: safety, repositioning, encourage eating. PT, OT Discharge Plan: Yes CM following, 1 hospice consult with [facility], asked to ID another hospice agency Active Consults: 
IP CONSULT TO HOSPITALIST 
IP CONSULT TO NEUROLOGY 
IP CONSULT TO HEMATOLOGY IP CONSULT TO PALLIATIVE CARE - PROVIDER 
IP CONSULT TO PULMONOLOGY 
IP CONSULT TO GENERAL SURGERY

## 2020-09-05 NOTE — PROGRESS NOTES
Problem: Falls - Risk of 
Goal: *Absence of Falls Description: Document Elmer Woodson Fall Risk and appropriate interventions in the flowsheet. Outcome: Progressing Towards Goal 
Note: Fall Risk Interventions: 
Mobility Interventions: Bed/chair exit alarm Mentation Interventions: Bed/chair exit alarm, Door open when patient unattended Medication Interventions: Bed/chair exit alarm, Evaluate medications/consider consulting pharmacy, Patient to call before getting OOB Elimination Interventions: Bed/chair exit alarm, Call light in reach, Patient to call for help with toileting needs History of Falls Interventions: Bed/chair exit alarm, Consult care management for discharge planning, Door open when patient unattended, Evaluate medications/consider consulting pharmacy Problem: Pressure Injury - Risk of 
Goal: *Prevention of pressure injury Description: Document Chu Scale and appropriate interventions in the flowsheet. Outcome: Progressing Towards Goal 
Note: Pressure Injury Interventions: 
Sensory Interventions: Assess changes in LOC Moisture Interventions: Absorbent underpads, Internal/External urinary devices Activity Interventions: Pressure redistribution bed/mattress(bed type), PT/OT evaluation Mobility Interventions: Float heels, HOB 30 degrees or less, Pressure redistribution bed/mattress (bed type), Turn and reposition approx. every two hours(pillow and wedges) Nutrition Interventions: Document food/fluid/supplement intake Friction and Shear Interventions: Apply protective barrier, creams and emollients, HOB 30 degrees or less, Minimize layers

## 2020-09-05 NOTE — PROGRESS NOTES
Problem: Falls - Risk of 
Goal: *Absence of Falls Description: Document Ba Cuellar Fall Risk and appropriate interventions in the flowsheet. Outcome: Progressing Towards Goal 
Note: Fall Risk Interventions: 
Mobility Interventions: Bed/chair exit alarm, Patient to call before getting OOB Mentation Interventions: Bed/chair exit alarm, Door open when patient unattended Medication Interventions: Bed/chair exit alarm, Patient to call before getting OOB, Teach patient to arise slowly Elimination Interventions: Bed/chair exit alarm, Call light in reach History of Falls Interventions: Bed/chair exit alarm, Investigate reason for fall Problem: Patient Education: Go to Patient Education Activity Goal: Patient/Family Education Outcome: Progressing Towards Goal 
  
Problem: Patient Education: Go to Patient Education Activity Goal: Patient/Family Education Outcome: Progressing Towards Goal 
  
Problem: TIA/CVA Stroke: 0-24 hours Goal: Off Pathway (Use only if patient is Off Pathway) Outcome: Progressing Towards Goal 
Goal: Activity/Safety Outcome: Progressing Towards Goal 
Goal: Consults, if ordered Outcome: Progressing Towards Goal 
Goal: Diagnostic Test/Procedures Outcome: Progressing Towards Goal 
Goal: Nutrition/Diet Outcome: Progressing Towards Goal 
Goal: Discharge Planning Outcome: Progressing Towards Goal 
Goal: Medications Outcome: Progressing Towards Goal 
Goal: Respiratory Outcome: Progressing Towards Goal 
Goal: Treatments/Interventions/Procedures Outcome: Progressing Towards Goal 
Goal: Minimize risk of bleeding post-thrombolytic infusion Outcome: Progressing Towards Goal 
Goal: Monitor for complications post-thrombolytic infusion Outcome: Progressing Towards Goal 
Goal: Psychosocial 
Outcome: Progressing Towards Goal 
Goal: *Hemodynamically stable Outcome: Progressing Towards Goal 
Goal: *Neurologically stable Description: Absence of additional neurological deficits Outcome: Progressing Towards Goal 
Goal: *Verbalizes anxiety and depression are reduced or absent Outcome: Progressing Towards Goal 
Goal: *Absence of Signs of Aspiration on Current Diet Outcome: Progressing Towards Goal 
Goal: *Absence of deep venous thrombosis signs and symptoms(Stroke Metric) Outcome: Progressing Towards Goal 
Goal: *Ability to perform ADLs and demonstrates progressive mobility and function Outcome: Progressing Towards Goal 
Goal: *Stroke education started(Stroke Metric) Outcome: Progressing Towards Goal 
Goal: *Dysphagia screen performed(Stroke Metric) Outcome: Progressing Towards Goal 
Goal: *Rehab consulted(Stroke Metric) Outcome: Progressing Towards Goal 
  
Problem: TIA/CVA Stroke: Day 2 Until Discharge Goal: Off Pathway (Use only if patient is Off Pathway) Outcome: Progressing Towards Goal 
Goal: Activity/Safety Outcome: Progressing Towards Goal 
Goal: Diagnostic Test/Procedures Outcome: Progressing Towards Goal 
Goal: Nutrition/Diet Outcome: Progressing Towards Goal 
Goal: Discharge Planning Outcome: Progressing Towards Goal 
Goal: Medications Outcome: Progressing Towards Goal 
Goal: Respiratory Outcome: Progressing Towards Goal 
Goal: Treatments/Interventions/Procedures Outcome: Progressing Towards Goal 
Goal: Psychosocial 
Outcome: Progressing Towards Goal 
Goal: *Verbalizes anxiety and depression are reduced or absent Outcome: Progressing Towards Goal 
Goal: *Absence of aspiration Outcome: Progressing Towards Goal 
Goal: *Absence of deep venous thrombosis signs and symptoms(Stroke Metric) Outcome: Progressing Towards Goal 
Goal: *Optimal pain control at patient's stated goal 
Outcome: Progressing Towards Goal 
Goal: *Tolerating diet Outcome: Progressing Towards Goal 
Goal: *Ability to perform ADLs and demonstrates progressive mobility and function Outcome: Progressing Towards Goal 
Goal: *Stroke education continued(Stroke Metric) Outcome: Progressing Towards Goal 
 Problem: Ischemic Stroke: Discharge Outcomes Goal: *Verbalizes anxiety and depression are reduced or absent Outcome: Progressing Towards Goal 
Goal: *Verbalize understanding of risk factor modification(Stroke Metric) Outcome: Progressing Towards Goal 
Goal: *Hemodynamically stable Outcome: Progressing Towards Goal 
Goal: *Absence of aspiration pneumonia Outcome: Progressing Towards Goal 
Goal: *Aware of needed dietary changes Outcome: Progressing Towards Goal 
Goal: *Verbalize understanding of prescribed medications including anti-coagulants, anti-lipid, and/or anti-platelets(Stroke Metric) Outcome: Progressing Towards Goal 
Goal: *Tolerating diet Outcome: Progressing Towards Goal 
Goal: *Aware of follow-up diagnostics related to anticoagulants Outcome: Progressing Towards Goal 
Goal: *Ability to perform ADLs and demonstrates progressive mobility and function Outcome: Progressing Towards Goal 
Goal: *Absence of DVT(Stroke Metric) Outcome: Progressing Towards Goal 
Goal: *Absence of aspiration Outcome: Progressing Towards Goal 
Goal: *Optimal pain control at patient's stated goal 
Outcome: Progressing Towards Goal 
Goal: *Home safety concerns addressed Outcome: Progressing Towards Goal 
Goal: *Describes available resources and support systems Outcome: Progressing Towards Goal 
Goal: *Verbalizes understanding of activation of EMS(911) for stroke symptoms(Stroke Metric) Outcome: Progressing Towards Goal 
Goal: *Understands and describes signs and symptoms to report to providers(Stroke Metric) Outcome: Progressing Towards Goal 
Goal: *Neurolgocially stable (absence of additional neurological deficits) Outcome: Progressing Towards Goal 
Goal: *Verbalizes importance of follow-up with primary care physician(Stroke Metric) Outcome: Progressing Towards Goal 
Goal: *Smoking cessation discussed,if applicable(Stroke Metric) Outcome: Progressing Towards Goal 
 Goal: *Depression screening completed(Stroke Metric) Outcome: Progressing Towards Goal

## 2020-09-05 NOTE — HOSPICE
[facility] 
[phone] Nursing Note Patient Name: Cayden Bland YOB: 1955 Age: 60 y.o. [address] RN Note: There has been an update from our intake department:  [facility] CAN accept patient. Will be calling Junior Hernandez/spouse with update. If there are any questions, please call [facility] at [phone]. Thank you for the opportunity to be of service to this patient and her family. 0900:  Spoke with Junior Collins/spouse via phone. This liaison and Diamond Barbosa will be meeting with spouse today at [facility]. 
 
1100: After meeting with Mr. Bjorn Trevizo, he has agreed with hospice admission at home. Brady Alvarez/RA[facility] completed consents with Mr. Bjorn Trevizo. DMEs are scheduled for deliver tomorrow afternoon via [facility] (bed with full rails, OBT, BSC) with home admission at 11am on Monday, 9/7. [place] message sent to Dr. Lennie Willson with update and request for d/c on Monday, 9/7. Weekend CM: please schedule pickup/transport for 9:30am, Monday, 9/7. [facility] medications will be ordered and delivered to the home tomorrow afternoon by [facility].

## 2020-09-05 NOTE — PROGRESS NOTES
Hospitalist Progress Note NAME: Medina Wright :  1955 MRN:  [id number] Assessment / Plan: 
 
80-year-old female complaining of a few months of generalized weakness, frequent falls, extremity weakness especially in the legs, weight loss, confusion and forgetfulness. On exam Mild tremor.  4/5 strength bilateral lower extremities. Of note she just had a recent head CT  Head on   which was normal, just showed microvascular changes.  Her H&P is not at all suggestive of acute CVA, no indication for CT angiography at this time. Labs reassuring Except k 3.3 . Per patient she also lost almost 30 pounds of weight in last couple of month because she did not feel like eating. Patient has history of gastric sleeve for weight loss 4 years ago. Patient denied to be depressed. 
  
Metastatic, stage IV lung cancer with mets to brain and spine, poor progosis Generalized weaknesss more in Bilateral leg weakness( intermittent) poa Weight loss poa Mild tremors Concern for metastatic lung ca to brain , thoracic spine Nonfocal exam. Progressive symptoms for months. CT scan a couple weeks ago nonspecific. MRI c spine CT chest - metastatic carcinoma of lung MRI Brain - Metastases, liekly 2/2 lung cancer Discussed with hospice - very poor prognosis S/p LN biopsy Left supraclavicular lymphadenopathy on  Awaiting path Palliative on board Hospice consulted  agreed to move to comfort care today. Will go home Monday AM on hospice with [address] 
  
UTI Started on ceftriaxone Hypokalemia- repleted  Monitor   Mag wnl . 
  
Uncontrolled HTN-172/91  Resume cozaar , start norvasc  And give iv hydralazine prn 
+ prn labetolol  
  
  
Code Status: full Surrogate Decision Maker: Yefri Hoang  Spouse  [phone]   
  
  
  
DVT Prophylaxis: sq lovenox GI Prophylaxis: not indicated 
  
Baseline: independent 30.0 - 39.9 Obese / Body mass index is 33.97 kg/m². Subjective: Chief Complaint / Reason for Physician Visit FU leg weakness . pt mute today , didn't want to talk , only nodding . Flat affecr Review of Systems: 
Symptom Y/N Comments  Symptom Y/N Comments Fever/Chills    Chest Pain Poor Appetite    Edema Cough    Abdominal Pain Sputum    Joint Pain SOB/MEZA    Pruritis/Rash Nausea/vomit    Tolerating PT/OT Diarrhea    Tolerating Diet y Constipation    Other Could NOT obtain due to: Uncooperative Objective: VITALS:  
Last 24hrs VS reviewed since prior progress note. Most recent are: 
Patient Vitals for the past 24 hrs: 
 Temp Pulse Resp BP SpO2  
09/05/20 1458 98.4 °F (36.9 °C) 88 16 147/72 100 % 09/05/20 1039 98.5 °F (36.9 °C) (!) 112 16 142/83 97 % 09/05/20 0630 98.6 °F (37 °C) 83 16 161/86 96 % 09/05/20 0302 98.3 °F (36.8 °C) 69 18 159/83 97 % 09/04/20 2343 98.6 °F (37 °C) 91 16 141/78   
09/04/20 1857 97.6 °F (36.4 °C) 96 16 143/67 97 % Intake/Output Summary (Last 24 hours) at 9/5/2020 1744 Last data filed at 9/4/2020 2008 Gross per 24 hour Intake  Output 400 ml Net -400 ml PHYSICAL EXAM: 
General: WD, WN. Alert, cooperative, no acute distress   
EENT:  EOMI. Anicteric sclerae. MMM Resp:  CTA bilaterally, no wheezing or rales. No accessory muscle use CV:  Regular  rhythm,  No edema GI:  Soft, Non distended, Non tender.  +Bowel sounds Neurologic:  Alert and oriented X 3, normal speech, Psych:   Poor insight Skin:  No rashes. No jaundice Reviewed most current lab test results and cultures  YES Reviewed most current radiology test results   YES Review and summation of old records today    NO Reviewed patient's current orders and MAR    YES 
PMH/SH reviewed - no change compared to H&P 
________________________________________________________________________ Care Plan discussed with: 
  Comments Patient x Family RN x Care Manager Consultant Multidiciplinary team rounds were held today with , nursing, pharmacist and clinical coordinator. Patient's plan of care was discussed; medications were reviewed and discharge planning was addressed. ________________________________________________________________________ Total NON critical care TIME: 65   Minutes Total CRITICAL CARE TIME Spent:   Minutes non procedure based Comments >50% of visit spent in counseling and coordination of care x Extensive discussion about plans/hospice with . Making patient comfort care  
________________________________________________________________________ Mesha Sheehan MD  
 
Procedures: see electronic medical records for all procedures/Xrays and details which were not copied into this note but were reviewed prior to creation of Plan. LABS: 
I reviewed today's most current labs and imaging studies. Pertinent labs include: No results for input(s): WBC, HGB, HCT, PLT, HGBEXT, HCTEXT, PLTEXT, HGBEXT, HCTEXT, PLTEXT in the last 72 hours. Recent Labs  
  09/05/20 
0307 09/04/20 
0502 09/03/20 
0255 * 132* 132* K 4.5 3.9 4.0  
CL 99 99 99 CO2 33* 31 32 * 128* 144* BUN 26* 20 21* CREA 0.56 0.55 0.50* CA 9.1 8.9 8.9 ALB 2.9* 2.8* 2.8* TBILI 1.0 0.9 0.5 * 54 44 Signed: Mesha Sheehan MD

## 2020-09-05 NOTE — ACP (ADVANCE CARE PLANNING)
Advance Care Planning Advance Care Planning (ACP) Physician/NP/PA (Provider) Anshul Thorpe Date of ACP Conversation: 8/30/2020 Conversation Conducted with:  Patient and her , whom is currentluy making her medical decisions. Patient admitted for falls and memory cahnges. She has been found to have metastatic lung cancer with mets to the brain and spine. I spent approximately an hour discussing hospicce, comfort care, and patient's care with . She is already DNR/DNI, but they agree patient wants to be moved to comfort care only.  would like us to stop vitals, stop meds with exception of those that can imporve pain/comfort. Agrees with continuing pain meds, steroids, as well as BP meds as she has a Hx of HTN emergency and headaches. Length of Voluntary ACP Conversation in minutes:  55 minutes Joan Gong MD

## 2020-09-05 NOTE — HOSPICE
[address] Worker Note: 
 
LCSW met with pts  Peter Delaney) for hospice informational visit. Pts  and pt have been  for 17 years (anniversary Nov 8). This is pts second marriage and pts husbands 3rd marriage. Pt has one daughter from a previous marriage and pts  has three children. Pts  stated pt does not have a good relationship with most of her family. Pts  stated pt does not talk to her daughter very much unless there is a need. Pt is originally from [place] and pts  is from [place]. Pt and pts  met online. Pts  stated this has been a very quick decline and pt was shopping/active up until a few weeks ago. Pts  said that is has been very hard on him watching pt deteriorate. Pts  was very tearful as he spoke about how th doctors have said pt probably has a life expectancy of 1 month or less. Pts  stated his only goals are for pt to smile and be comfortable. Pts  stated he would like pt to be admitted to hospice as it aligns with their goals for EOL. Pts  stated pt is now bed bound. Pt  stated he also has many health problems of his own and is limited in the hands on care he can provide. LCSW discussed hiring cgs but pts  stated they were limited financially. LCSW reviewed [facility] and other agencies in the hospice resource guide. Pts  stated pt does not have long term care insurance. LCSW discussed applying for Medicaid. Pt  stated he thought they would meet the financial limitations. LCSW assisted pts  with setting up online application for Medicaid and plan to finish this when pts  was to access his email/hopefully Sunday. LCSW discussed need for a UAI. LCSW called care manager on call Barry Dangelo ext [phone]) and requested a UAI be completed on pt and copy faxed to hospice at [phone], to assist with setting up cg plan.  Pts  has one friend whom he stated could come sit with pt for a hour a week so he could go to the store. Pts  also stated his 2 sisters planned to visit this week to help him with paperwork as pt was the one who paid all the bills/did all the paperwork/insurance. Pts  stated he did not have a lot of hands on support in the immediate area. LCSW and RN discussed amount of care that would be needed when pt returns home. Pt is currently on short term disability from work as she was working up until a month ago. Pts  stated he would also need to apply for Medicare as he was on her insurance. LCSW provided Social Security phone number and also information on applying for disability. Pts  talked about previous losses of his father 5 yrs ago on hospice. Pts  stated he would like a hospital bed and stated his home is on one level. Pts  was hoping pt would be discharged on Wednesday afternoon to give him time to set things up. Pts  stated pt is [employer] but has been non-practicing for many years. Pts  stated pt loves music and they maybe interested in music therapy. LCSW provided supportive counseling and talked about counseling services. LCSW will follow-up with pts  tomorrow regarding resources. LCSW will continue to monitor and assess needs. Yeison Shaikh LCSW [facility]  [phone]

## 2020-09-06 NOTE — PROGRESS NOTES
PT note:  
 
Noted PT orders discontinued by hospitalist. Per note, patient is going home with family on hospice on Monday.  Thank you for the referral.  
 
Melanie Nesbitt, PT, DPT

## 2020-09-06 NOTE — PROGRESS NOTES
[facility] [address] to open Pt at Pt home at [address] asked for me to set up transport for 9:30 AM  time. CM set up [facility] to  Pt at 9:30 AM.   
PCS packet on bedside chart. No further CM needs. [place], [address] [facility]

## 2020-09-06 NOTE — HOSPICE
[employer] Good Help to Those in Need 
[phone] Patient Name: Юлия Reddy YOB: 1955 Age: 60 y.o. [facility] Liaison Note:  
 
Symptom relief kit ordered from [facility] to be delivered this afternoon to patient's home.

## 2020-09-06 NOTE — HOSPICE
[employer] Good [facility] to Those in Need 
[phone] Inpatient Nursing PRE Admission Patient Name: Jae Posadas YOB: 1955 Age: 60 y.o. Date of PLANNED Hospice Admission: 2020 Hospice Attending: Dr. Cristal Merida Primary Care Physician: None Home Hospice Zip Code: [address] Expected  (if patient transferred to [facility]): TBD ADVANCE CARE PLANNING Code Status: DNR Durable DNR: []  Yes  [x]  No 
Advance Care Planning 2020 Confirm Advance Directive None Patient Would Like to Complete Advance Directive - Marco Cousins  Spouse  [phone] [place]: NO PREFERENCE  Home: TBD HOSPICE SUMMARY  
ER Visits/ Hospitalizations in past year: 1 Hospice Diagnosis:Metastatic lung cancer Onset Date of Hospice Diagnosis:  
Summary of Disease Progression Leading to Hospice Diagnosis: Jae Posadas is a 60year old female who presented to the ED on  with complaint of generalized weakness with frequent falls. She also says she has intermittent weakness of her lower extremities, greater on the right. MRI Brain showed extensive leptomeningeal enhancement. CT chest showed a large left upper lobe lung mass, contralateral mediastinal and ipsilateral supraclavicular node, hepatic metastases and scattered osseous metastases. She has had a 30 lb unintentional weight loss and mental confusion. She denies headache. 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 She quit smoking in . Co-Morbidities:  
Patient Active Problem List  
Diagnosis Code  Hypokalemia E87.6  Confusion R41.0  Tremor R25.1  Weakness R53.1  Metastatic lung cancer (metastasis from lung to other site) [facility]) C34.90  Carcinoma of cerebral meninges ([facility] [address] C70.0  Paraplegic gait R26.1  Acute alteration in mental status R41.82  
 Convulsive syncope R55  Somnolence R40.0  DNR (do not resuscitate) discussion Z70.80  
 Advanced care planning/counseling discussion Z71.89  
 Goals of care, counseling/discussion Z71.89  
 Debility R53.81  
 Frailty R54  Anorexia R63.0 Diagnoses RELATED to the terminal prognosis: confusion, paraplegic gait, convulsive syncope, anorexia Other Diagnoses: tremor, somnolence, debility, weakness, hypokalemia Rationale for a prognosis of life expectancy of 6 months or less if the disease follows its normal course (Disease Specific History):  
 
Cecil Child is a 60 y.o. who was admitted to [facility]. The patient's principle diagnosis of metastatic lung cancer has resulted in weakness, SOB, fatigue, malnutrition, pain. Functionally, the patient's Palliative Performance Scale has declined over a period of one month and is estimated at 30. Objective information that support this patients limited prognosis includes: MRI of Brain: 8/31/2020 
  
Impression: 1. Extensive leptomeningeal enhancement as well as several subtle areas of 
parenchymal enhancement, highly suspicious for metastatic disease given findings 
on chest CT earlier today suspicious for bronchogenic carcinoma. 2. Extensive chronic white matter disease and areas of remote infarction, with 
no acute intracranial hemorrhage or infarction. CT of chest: 
 
IMPRESSION: Advanced lung carcinoma: 
1. Large left upper lobe lung carcinoma. 2. Numerous jany metastases, including contralateral mediastinal and 
ipsilateral supraclavicular. 3. Diffuse, bilateral, pulmonary metastases. 4. Scattered osseous metastases. 5. Few hepatic metastases. FINAL PATHOLOGIC DIAGNOSIS Left supraclavicular lymph node, excisional biopsy:  
Metastatic adenocarcinoma, see comment Comment Sections show metastatic adenocarcinoma.  Although possibly consistent with lung primary, this has histologic features of serous carcinoma and possibility of a GYN primary should be strongly considered. Evaluation by immunohistochemical stains is pending. The patient/family chose comfort measures with the support of Hospice. Patient meets for Routine LOC as evidenced by able to tolerate oral medications, stable for transport, care plan in place. Verbal certification of terminal diagnosis with life expectancy of 6 months or less received from: Dr. Kalina Sherman Prognosis estimated based on 09/06/20 clinical assessment is:  
[] Few to Many Hours [] Hours to Days  
[] Few to Many Days [x] Days to Weeks  
[] Few to Many Weeks  
[] Weeks to Months  
[] Few to Many Months CLINICAL INFORMATION Allergies: Allergies Allergen Reactions  Pcn [Penicillins] Hives Wt Readings from Last 3 Encounters:  
09/02/20 104.3 kg (230 lb) Ht Readings from Last 3 Encounters:  
09/04/20 5' 9\" (1.753 m) Body mass index is 33.97 kg/m². Visit Vitals /76 (BP 1 Location: Right arm, BP Patient Position: At rest) Pulse (!) 105 Temp 98.1 °F (36.7 °C) Resp 20 Ht 5' 9\" (1.753 m) Wt 104.3 kg (230 lb) SpO2 98% Breastfeeding No  
BMI 33.97 kg/m² LAB VALUES No results found for this visit on 08/30/20 (from the past 12 hour(s)). No results found for this visit on 08/30/20 (from the past 6 hour(s)). Lab Results Component Value Date/Time Protein, total 6.4 09/05/2020 03:07 AM  
 Albumin 2.9 (L) 09/05/2020 03:07 AM  
 
 
Currently this patient has: 
[] Supplemental O2 [x] Peripheral IV  [] PICC    [] PORT  
[] Wilkerson Catheter [] NG Tube   [] PEG Tube [] Ostomy   
[] AICD: Has ICD been deactivated? [] Yes [] No:______  Patient was using a purewick Progression to DEPENDENCE WITH ADLs (include time frame): today x- Dependent for bathing: personal hygiene and grooming x- Dependent for dressing: dressing and undressing x- Dependent for transferring: movement and mobility x- Dependent for toileting: continence-related tasks including control and hygiene x- Dependent for eating: preparing food and feeding ASSESSMENT & PLAN 1. Symptom Issues Identified: dyspnea, confusion, pain, lack of appetite 2. Spiritual Issues Identified:Pts  stated pt is Hinduism but has been non-practicing for many years. Pts  stated pt loves music and they maybe interested in music therapy. 3.  Psych/ Social/ Emotional Issues Identified: She lives with her  Marina Hunter of 15 years and their [place] Bulldog. She has one daughter who lives in [place] and one grandchild. Her  has 3 children, none of whom are local.  Her mom is still living and she has 2 sisters as well. 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 Care Coordination:  
Transfer to: Home Report given to: admit RN via this report Transportation by: [facility] Scheduled for 0930 Medications Needs: SRK to be delivered this afternoon to patients home Current Facility-Administered Medications:  
  amLODIPine (NORVASC) tablet 10 mg, 10 mg, Oral, DAILY, Radha Maldonado MD, 10 mg at 09/04/20 0847 
  labetaloL (NORMODYNE;TRANDATE) injection 20 mg, 20 mg, IntraVENous, Q4H PRN, Radha Maldonado MD 
  alcohol 62% (NOZIN) nasal  1 Ampule, 1 Ampule, Topical, Q12H, Mati Alvarado MD, 1 Ampule at 09/04/20 0847 
  zinc oxide-cod liver oil (DESITIN) 40 % paste, , Topical, TID, Radha Maldonado MD 
  sodium chloride (NS) flush 5-40 mL, 5-40 mL, IntraVENous, Q8H, Din, Issac CAZARES MD, 10 mL at 09/04/20 1143   sodium chloride (NS) flush 5-40 mL, 5-40 mL, IntraVENous, PRN, Din, Isabel Melo MD, 10 mL at 09/02/20 2344   acetaminophen (TYLENOL) tablet 650 mg, 650 mg, Oral, Q6H PRN, 650 mg at 09/02/20 1150 **OR** acetaminophen (TYLENOL) suppository 650 mg, 650 mg, Rectal, Q6H PRN, DinIsabel MD 
   polyethylene glycol (MIRALAX) packet 17 g, 17 g, Oral, DAILY PRN, Din, Tiffany Arzate MD 
  promethazine (PHENERGAN) tablet 12.5 mg, 12.5 mg, Oral, Q6H PRN **OR** ondansetron (ZOFRAN) injection 4 mg, 4 mg, IntraVENous, Q6H PRN, Din, Issac CAZARES MD 
  enoxaparin (LOVENOX) injection 40 mg, 40 mg, SubCUTAneous, DAILY, Din, Tiffany Arzate MD, 40 mg at 09/04/20 2480   famotidine (PEPCID) tablet 20 mg, 20 mg, Oral, BID, Din, Tiffany Arzate MD, 20 mg at 09/04/20 7935   losartan (COZAAR) tablet 100 mg, 100 mg, Oral, DAILY, Santhosh Nichole MD, 100 mg at 09/04/20 0847 
  dexamethasone (DECADRON) 4 mg/mL injection 4 mg, 4 mg, IntraVENous, Q6H, Judah Duarte MD, 4 mg at 09/04/20 1143 
  
DME: DMEs are scheduled for deliver Sunday afternoon via [facility] (bed with full rails, OBT, BSC) Supplies: gloves, briefs, chux COVID: 
 
1. Has the patient traveled in the last 14 days to areas with sustained community transmission? NO  
 
2. Does the patient have signs and symptoms of respiratory infection such as fever, cough, shortness of breath and sore throat? SOB r/t lung cancer 3. In the last 14 days has the patient had contact with someone under investigation for COVID-19 or are ill with respiratory illness. ?NO patient has not had contact with a COVID patient 4. Does the patient reside in a community where community-based spread of COVID-19 is occurring. ?NO  has been screened routinely at [id number] [facility] entrance. Wound: 
Unblanchable redness to the right buttocks that was present on admission. The gluteal cleft has some redness to the skin but this looks like it is caused by moisture and the skin is scabbing slightly. The skin is currently blanchable all around it. Apply Max. Strength Desitin cream (zinc oxide cream) to clean, DRY skin to heal and protect from moisture. Attend to incontinence promptly to protect the skin. No diapers on this patient.

## 2020-09-06 NOTE — PROGRESS NOTES
Bedside and Verbal shift change report given to Kelly RN (oncoming nurse) by Rambo Blakely RN (offgoing nurse). Report included the following information SBAR, Kardex, MAR and Recent Results.

## 2020-09-06 NOTE — PROGRESS NOTES
Problem: Falls - Risk of 
Goal: *Absence of Falls Description: Document Verito Moncada Fall Risk and appropriate interventions in the flowsheet. Outcome: Progressing Towards Goal 
Note: Fall Risk Interventions: 
Mobility Interventions: Bed/chair exit alarm, Patient to call before getting OOB Mentation Interventions: Bed/chair exit alarm Medication Interventions: Bed/chair exit alarm, Patient to call before getting OOB, Teach patient to arise slowly Elimination Interventions: Bed/chair exit alarm, Call light in reach History of Falls Interventions: Bed/chair exit alarm, Investigate reason for fall Problem: Patient Education: Go to Patient Education Activity Goal: Patient/Family Education Outcome: Progressing Towards Goal 
  
Problem: TIA/CVA Stroke: 0-24 hours Goal: Off Pathway (Use only if patient is Off Pathway) Outcome: Progressing Towards Goal 
Goal: Activity/Safety Outcome: Progressing Towards Goal 
Goal: Consults, if ordered Outcome: Progressing Towards Goal 
Goal: Diagnostic Test/Procedures Outcome: Progressing Towards Goal 
Goal: Nutrition/Diet Outcome: Progressing Towards Goal 
Goal: Discharge Planning Outcome: Progressing Towards Goal 
Goal: Medications Outcome: Progressing Towards Goal 
Goal: Respiratory Outcome: Progressing Towards Goal 
Goal: Treatments/Interventions/Procedures Outcome: Progressing Towards Goal 
Goal: Minimize risk of bleeding post-thrombolytic infusion Outcome: Progressing Towards Goal 
Goal: Monitor for complications post-thrombolytic infusion Outcome: Progressing Towards Goal 
Goal: Psychosocial 
Outcome: Progressing Towards Goal 
Goal: *Hemodynamically stable Outcome: Progressing Towards Goal 
Goal: *Neurologically stable Description: Absence of additional neurological deficits Outcome: Progressing Towards Goal 
Goal: *Verbalizes anxiety and depression are reduced or absent Outcome: Progressing Towards Goal 
 Goal: *Absence of Signs of Aspiration on Current Diet Outcome: Progressing Towards Goal 
Goal: *Absence of deep venous thrombosis signs and symptoms(Stroke Metric) Outcome: Progressing Towards Goal 
Goal: *Ability to perform ADLs and demonstrates progressive mobility and function Outcome: Progressing Towards Goal 
Goal: *Stroke education started(Stroke Metric) Outcome: Progressing Towards Goal 
Goal: *Dysphagia screen performed(Stroke Metric) Outcome: Progressing Towards Goal 
Goal: *Rehab consulted(Stroke Metric) Outcome: Progressing Towards Goal 
  
Problem: TIA/CVA Stroke: Day 2 Until Discharge Goal: Off Pathway (Use only if patient is Off Pathway) Outcome: Progressing Towards Goal 
Goal: Activity/Safety Outcome: Progressing Towards Goal 
Goal: Diagnostic Test/Procedures Outcome: Progressing Towards Goal 
Goal: Nutrition/Diet Outcome: Progressing Towards Goal 
Goal: Discharge Planning Outcome: Progressing Towards Goal 
Goal: Medications Outcome: Progressing Towards Goal 
Goal: Respiratory Outcome: Progressing Towards Goal 
Goal: Treatments/Interventions/Procedures Outcome: Progressing Towards Goal 
Goal: Psychosocial 
Outcome: Progressing Towards Goal 
Goal: *Verbalizes anxiety and depression are reduced or absent Outcome: Progressing Towards Goal 
Goal: *Absence of aspiration Outcome: Progressing Towards Goal 
Goal: *Absence of deep venous thrombosis signs and symptoms(Stroke Metric) Outcome: Progressing Towards Goal 
Goal: *Optimal pain control at patient's stated goal 
Outcome: Progressing Towards Goal 
Goal: *Tolerating diet Outcome: Progressing Towards Goal 
Goal: *Ability to perform ADLs and demonstrates progressive mobility and function Outcome: Progressing Towards Goal 
Goal: *Stroke education continued(Stroke Metric) Outcome: Progressing Towards Goal 
  
Problem: Ischemic Stroke: Discharge Outcomes Goal: *Verbalizes anxiety and depression are reduced or absent Outcome: Progressing Towards Goal 
Goal: *Verbalize understanding of risk factor modification(Stroke Metric) Outcome: Progressing Towards Goal 
Goal: *Hemodynamically stable Outcome: Progressing Towards Goal 
Goal: *Absence of aspiration pneumonia Outcome: Progressing Towards Goal 
Goal: *Aware of needed dietary changes Outcome: Progressing Towards Goal 
Goal: *Verbalize understanding of prescribed medications including anti-coagulants, anti-lipid, and/or anti-platelets(Stroke Metric) Outcome: Progressing Towards Goal 
Goal: *Tolerating diet Outcome: Progressing Towards Goal 
Goal: *Aware of follow-up diagnostics related to anticoagulants Outcome: Progressing Towards Goal 
Goal: *Ability to perform ADLs and demonstrates progressive mobility and function Outcome: Progressing Towards Goal 
Goal: *Absence of DVT(Stroke Metric) Outcome: Progressing Towards Goal 
Goal: *Absence of aspiration Outcome: Progressing Towards Goal 
Goal: *Optimal pain control at patient's stated goal 
Outcome: Progressing Towards Goal 
Goal: *Home safety concerns addressed Outcome: Progressing Towards Goal 
Goal: *Describes available resources and support systems Outcome: Progressing Towards Goal 
Goal: *Verbalizes understanding of activation of EMS(911) for stroke symptoms(Stroke Metric) Outcome: Progressing Towards Goal 
Goal: *Understands and describes signs and symptoms to report to providers(Stroke Metric) Outcome: Progressing Towards Goal 
Goal: *Neurolgocially stable (absence of additional neurological deficits) Outcome: Progressing Towards Goal 
Goal: *Verbalizes importance of follow-up with primary care physician(Stroke Metric) Outcome: Progressing Towards Goal 
Goal: *Smoking cessation discussed,if applicable(Stroke Metric) Outcome: Progressing Towards Goal 
Goal: *Depression screening completed(Stroke Metric) Outcome: Progressing Towards Goal

## 2020-09-06 NOTE — PROGRESS NOTES
Bedside and Verbal shift change report given to Anastasiia RN (oncoming nurse) by Liliya Mercer RN (offgoing nurse). Report included the following information SBAR, Kardex, MAR and Recent Results.

## 2020-09-06 NOTE — PROGRESS NOTES
The patient's  took home the patient's glasses, [employer] overnight bag, and white cotton face mask in preparation for her discharge tomorrow.

## 2020-09-06 NOTE — PROGRESS NOTES
Hospitalist Progress Note NAME: Jae Posadas :  1955 MRN:  [id number] Assessment / Plan: 
Patient is pending discharge on hospice for metastatic lung cancer to brain and spine. Family would like to take her home Monday at 0930. Hospice taking DME to patient's house today.  
  
Metastatic, stage IV lung cancer with mets to brain and spine, poor progosis Generalized weaknesss more in Bilateral leg weakness( intermittent) poa Weight loss poa Mild tremors Concern for metastatic lung ca to brain , thoracic spine Nonfocal exam.  
Progressive symptoms for months. CT scan a couple weeks ago nonspecific. CT chest - metastatic carcinoma of lung MRI Brain - Metastases, liekly 2/2 lung cancer Discussed with hospice - very poor prognosis - DC in AM with home hospice S/p LN biopsy Left supraclavicular lymphadenopathy on  Awaiting path Palliative on board Hospice consulted  agreed to move to comfort care today. Will go home Monday AM on hospice with [address] 
  
UTI Started on ceftriaxone Hypokalemia- repleted  Monitor   Mag wnl . 
  
Uncontrolled HTN-172/91  Resume cozaar , start norvasc  And give iv hydralazine prn 
+ prn labetolol  
  
  
Code Status: full Surrogate Decision Maker: Marco Cousins  Spouse  [phone]   
  
  
  
DVT Prophylaxis: sq lovenox GI Prophylaxis: not indicated 
  
Baseline: independent 30.0 - 39.9 Obese / Body mass index is 33.97 kg/m². Subjective: Chief Complaint / Reason for Physician Visit FU leg weakness . pt mute today , didn't want to talk , only nodding . Flat affecr Review of Systems: 
Symptom Y/N Comments  Symptom Y/N Comments Fever/Chills    Chest Pain Poor Appetite    Edema Cough    Abdominal Pain Sputum    Joint Pain SOB/MEZA    Pruritis/Rash Nausea/vomit    Tolerating PT/OT Diarrhea    Tolerating Diet y Constipation    Other Could NOT obtain due to: Uncooperative Objective: VITALS:  
Last 24hrs VS reviewed since prior progress note. Most recent are: 
Patient Vitals for the past 24 hrs: 
 Temp Pulse Resp BP SpO2  
09/06/20 1119 98.1 °F (36.7 °C) (!) 105 20 159/76 98 % 09/06/20 0118 98.1 °F (36.7 °C) 85 16 160/66 98 % 09/05/20 1458 98.4 °F (36.9 °C) 88 16 147/72 100 % Intake/Output Summary (Last 24 hours) at 9/6/2020 1159 Last data filed at 9/6/2020 4214 Gross per 24 hour Intake 120 ml Output 250 ml Net -130 ml PHYSICAL EXAM: 
General: WD, WN. Alert, cooperative, no acute distress   
EENT:  EOMI. Anicteric sclerae. MMM Resp:  CTA bilaterally, no wheezing or rales. No accessory muscle use CV:  Regular  rhythm,  No edema GI:  Soft, Non distended, Non tender.  +Bowel sounds Neurologic:  Alert and oriented X 3, normal speech, Psych:   Poor insight Skin:  No rashes. No jaundice Reviewed most current lab test results and cultures  YES Reviewed most current radiology test results   YES Review and summation of old records today    NO Reviewed patient's current orders and MAR    YES 
PMH/ reviewed - no change compared to H&P 
________________________________________________________________________ Care Plan discussed with: 
  Comments Patient x Family RN x Care Manager Consultant Multidiciplinary team rounds were held today with , nursing, pharmacist and clinical coordinator. Patient's plan of care was discussed; medications were reviewed and discharge planning was addressed. ________________________________________________________________________ Total NON critical care TIME: 35   Minutes Total CRITICAL CARE TIME Spent:   Minutes non procedure based Comments >50% of visit spent in counseling and coordination of care x Extensive discussion about plans/hospice with . Making patient comfort care ________________________________________________________________________ Margaux Tanner MD  
 
Procedures: see electronic medical records for all procedures/Xrays and details which were not copied into this note but were reviewed prior to creation of Plan. LABS: 
I reviewed today's most current labs and imaging studies. Pertinent labs include: No results for input(s): WBC, HGB, HCT, PLT, HGBEXT, HCTEXT, PLTEXT, HGBEXT, HCTEXT, PLTEXT in the last 72 hours. Recent Labs  
  09/05/20 
0307 09/04/20 
0502 * 132* K 4.5 3.9 CL 99 99 CO2 33* 31 * 128* BUN 26* 20  
CREA 0.56 0.55 CA 9.1 8.9 ALB 2.9* 2.8* TBILI 1.0 0.9 * 54 Signed: Margaux Tanner MD

## 2020-09-07 NOTE — DISCHARGE SUMMARY
Hospitalist Discharge Summary Patient ID: 
Candelario Calderón 
[id number] 
89 y.o. 
1955 PCP on record: None Admit date: 8/30/2020 Discharge date and time: 9/7/2020 Admission Diagnoses: Weakness [R53.1] Tremor [R25.1] Confusion [R41.0] Hypokalemia [E87.6] Discharge Diagnoses: Active Problems: Hypokalemia (8/31/2020) Confusion (8/31/2020) Tremor (8/31/2020) Weakness (8/31/2020) Metastatic lung cancer (metastasis from lung to other site) [facility]) (8/31/2020) Carcinoma of cerebral meninges ([facility] [address] (8/31/2020) Paraplegic gait (8/31/2020) Acute alteration in mental status (8/31/2020) Convulsive syncope (8/31/2020) Somnolence () 
 
  DNR (do not resuscitate) discussion () Advanced care planning/counseling discussion () Goals of care, counseling/discussion () Debility () Frailty () Anorexia () Hospital Course: Metastatic, stage IV lung cancer with mets to brain and spine, poor progosis Generalized weaknesss more in Bilateral leg weakness( intermittent) poa Weight loss poa Mild tremors Concern for metastatic lung ca to brain , thoracic spine UTI - Treated with ceftriaxone as inpatient. Uncontrolled HTN- treated with home meds Presented with generalized weakness, falls, confusion, weight loss Progressive symptoms for months. T chest - metastatic carcinoma of lung with MRI brain suggestive of metastases. S/p LN biopsy Left supraclavicular lymphadenopathy on 09/02 path showing metastatic adenocarcinoma. Palliative medicine was consulted, patient transitioned to comfort care and discharged home on hospice care.  
  
  
  
Code Status: full Surrogate Decision Maker: Олег San  Spouse  [phone]   
  
 
 
CONSULTATIONS: 
IP CONSULT TO HOSPITALIST 
IP CONSULT TO NEUROLOGY 
IP CONSULT TO HEMATOLOGY 
IP CONSULT TO PALLIATIVE CARE - PROVIDER 
IP CONSULT TO PULMONOLOGY IP CONSULT TO GENERAL SURGERY Excerpted HPI from H&P of Lei Mccall MD: 
 64 y. o. female  with past medical history of hypertension and history of gastric sleeve operation for weight loss 4 years ago complains of generalized weakness and frequent falls, worsening over the past 2 to 3 months, gradual onset. Cindy Valencia has had some intermittent weakness in her extremities, especially right lower extremity, some numbness of the right foot.  She has an appointment coming up with a neurologist in 2 days.  No headaches.  She has also had some mild intermittent confusion and forgetfulness. Pt reporting she is having trouble getting her thoughts into actions. Pt stated she is having difficulty communicating to her limbs to preform the actions. Glucose 128 per EMS 
  
Tonight she had a minor fall at home, was unable to stand up, and called 911 for assistance Per patient she is scheduled to be seen by a neurologist not from our facility tomorrow to find out what is going on with her. 
  
We were asked to admit for work up and evaluation of the above problems.  
  
  
8/18/2020  [facility] 
  
Coby De Santiago MD     8/18/2020  9:49 AM   
[facility]   
Performed by: Coby De Santiago MD  Authorized by: Richard Brewster MD    
Consent Given by:  Patient   
   
Electromyography:    
 Site:  Right Leg   
 Number of right leg muscles studied:  5 or more   
Nerve Conduction:    
 Nerves tested:  3-4   
Findings/Interpretation:    
  Electrodiagnostic Findings   
1) Nerve conduction studies of the right lower extremity were essentially    
normal.   
2) Needle EMG of the right lower extremity and lumbar paraspinals was    
normal.   
   
Electrodiagnostic Impression   
Normal exam.  No evidence of peripheral neuropathy/impingement or active    
lumbar radiculopathy.   
   
  
X-ray lumbar spine 2 or 3 views (24875) (06/26/2020 9:56 AM EDT) X-ray lumbar spine 2 or 3 views (10780) (06/26/2020 9:56 AM EDT) Impressions Performed At X-rays of the lumbar spine show degenerative changes throughout with some    
calcifications of the abdominal aorta.  No fracture dislocation.   IMAGING    
  
  
  
 
______________________________________________________________________ DISCHARGE SUMMARY/HOSPITAL COURSE:  for full details see H&P, daily progress notes, labs, consult notes. Visit Vitals /66 (BP 1 Location: Right arm, BP Patient Position: At rest) Pulse 84 Temp 98.1 °F (36.7 °C) Resp 18 Ht 5' 9\" (1.753 m) Wt 104.3 kg (230 lb) SpO2 96% Breastfeeding No  
BMI 33.97 kg/m² Patient seen and examined by me on discharge day. Pertinent Findings: 
Gen:    Not in distress Chest: Clear lungs CVS:   Regular rhythm.  + systolic murmur. No edema Abd:  Soft, not distended, not tender Neuro:  Alert with poor insight. Oriented to person, place, and time  
_______________________________________________________________________ DISCHARGE MEDICATIONS:  
Current Discharge Medication List  
  
STOP taking these medications  
  
 losartan (COZAAR) 50 mg tablet Comments:  
Reason for Stopping: My Recommended Diet, Activity, Wound Care, and follow-up labs are listed in the patient's Discharge Insturctions which I have personally completed and reviewed. _______________________________________________________________________ DISPOSITION:    
Home with Family: Home hospice Home with HH/PT/OT/RN:   
SNF/LTC:   
TIFFANY:   
OTHER:   
 
 
Condition at Discharge:  Stable 
_______________________________________________________________________ Follow up with: PCP : None Follow-up Information Follow up With Specialties Details Why Contact Info [facility] [place] [facility] Emergency Medicine  As needed [address] [facility] 
[phone] None    None ([phone]) Patient stated that they have no PCP Monica Mix MD Palliative Medicine   [address] [id number] [phone] Total time in minutes spent coordinating this discharge (includes going over instructions, follow-up, prescriptions, and preparing report for sign off to her PCP) :  25 minutes Signed: 
Carlee Sagastume MD

## 2020-09-07 NOTE — PROGRESS NOTES
Transition of Care 
-Home with hospice: SHRUTHI ZHENG [facility] to admit at [address] [place] [address] [address] transport by 10:15am 
 
 
[facility] is now to arrive by 10:15am today. Nursing notified of this change. No further discharge needs at this time. SILVA Fernando, [facility]

## 2020-09-07 NOTE — PROGRESS NOTES
Discharge instructions reviewed and received by patient. Condition stable. To be picked up by [facility]. at 0930. Discharge papers sent with patient. IV's removed.

## 2020-09-07 NOTE — PROGRESS NOTES
Problem: Falls - Risk of 
Goal: *Absence of Falls Outcome: Resolved/Met Note: Fall Risk Interventions: 
Mobility Interventions: Communicate number of staff needed for ambulation/transfer Mentation Interventions: Adequate sleep, hydration, pain control, Bed/chair exit alarm, Door open when patient unattended, More frequent rounding, Update white board Medication Interventions: Evaluate medications/consider consulting pharmacy Elimination Interventions: Toileting schedule/hourly rounds History of Falls Interventions: Evaluate medications/consider consulting pharmacy

## 2020-09-07 NOTE — PROGRESS NOTES
Bedside and Verbal shift change report given to Eveline Apley, RN (oncoming nurse) by Jason Ennis RN (offgoing nurse). Report included the following information SBAR, Kardex, Intake/Output and MAR.

## 2020-09-09 PROBLEM — Z51.5 HOSPICE CARE PATIENT: Status: ACTIVE | Noted: 2020-01-01

## 2020-09-14 ENCOUNTER — HOME CARE VISIT (OUTPATIENT)
Dept: HOSPICE | Facility: HOSPICE | Age: 65
End: 2020-09-14
Payer: COMMERCIAL

## (undated) DEVICE — ROCKER SWITCH PENCIL BLADE ELECTRODE, HOLSTER: Brand: EDGE

## (undated) DEVICE — PAD NON-ADHERENT 3X4 STRL LF --

## (undated) DEVICE — SYR 10ML LUER LOK 1/5ML GRAD --

## (undated) DEVICE — DERMABOND SKIN ADH 0.7ML -- DERMABOND ADVANCED 12/BX

## (undated) DEVICE — STERILE POLYISOPRENE POWDER-FREE SURGICAL GLOVES: Brand: PROTEXIS

## (undated) DEVICE — HANDLE LT SNAP ON ULT DURABLE LENS FOR TRUMPF ALC DISPOSABLE

## (undated) DEVICE — PREP SKN CHLRAPRP APL 26ML STR --

## (undated) DEVICE — NEEDLE HYPO 22GA L1.5IN BLK S STL HUB POLYPR SHLD REG BVL

## (undated) DEVICE — ZINACTIVE USE 2641837 CLIP LIG M BLU TI HRT SHP WIRE HORZ 600 PER BX

## (undated) DEVICE — DBD-PACK,LAPAROTOMY,2 REINFORCED GOWNS: Brand: MEDLINE

## (undated) DEVICE — SURGICAL PROCEDURE PACK BASIN MAJ SET CUST NO CAUT

## (undated) DEVICE — SUTURE VCRL SZ 2-0 L27IN ABSRB UD L26MM SH 1/2 CIR J417H

## (undated) DEVICE — REM POLYHESIVE ADULT PATIENT RETURN ELECTRODE: Brand: VALLEYLAB

## (undated) DEVICE — CLIP INT SM WIDE RED TI TRNSVRS GRV CHEVRON SHP W PRECIS

## (undated) DEVICE — SPONGE HEMOSTAT CELLULS 4X8IN -- SURGICEL

## (undated) DEVICE — SUTURE VCRL SZ 3-0 L18IN ABSRB UD L26MM SH 1/2 CIR J864D

## (undated) DEVICE — INFECTION CONTROL KIT SYS

## (undated) DEVICE — SUT SLK 0 30IN SH BLK --

## (undated) DEVICE — STRAP,POSITIONING,KNEE/BODY,FOAM,4X60": Brand: MEDLINE

## (undated) DEVICE — SOLUTION IV 1000ML 0.9% SOD CHL

## (undated) DEVICE — SUTURE VCRL SZ 4-0 L27IN ABSRB UD L19MM PS-2 3/8 CIR PRIM J426H